# Patient Record
Sex: FEMALE | Race: WHITE | Employment: OTHER | URBAN - METROPOLITAN AREA
[De-identification: names, ages, dates, MRNs, and addresses within clinical notes are randomized per-mention and may not be internally consistent; named-entity substitution may affect disease eponyms.]

---

## 2020-01-27 ENCOUNTER — HOSPITAL ENCOUNTER (EMERGENCY)
Age: 65
Discharge: HOME | End: 2020-01-27
Attending: EMERGENCY MEDICINE
Payer: COMMERCIAL

## 2020-01-27 VITALS
SYSTOLIC BLOOD PRESSURE: 171 MMHG | TEMPERATURE: 97.9 F | WEIGHT: 260 LBS | OXYGEN SATURATION: 96 % | DIASTOLIC BLOOD PRESSURE: 75 MMHG | HEART RATE: 70 BPM | RESPIRATION RATE: 16 BRPM | BODY MASS INDEX: 41.97 KG/M2

## 2020-01-27 DIAGNOSIS — R00.2 PALPITATIONS: Primary | ICD-10-CM

## 2020-01-27 LAB
ANION GAP SERPL CALC-SCNC: 6 MMOL/L (ref 3–18)
ATRIAL RATE: 69 BPM
BASOPHILS # BLD: 0.1 K/UL (ref 0–0.1)
BASOPHILS NFR BLD: 1 % (ref 0–2)
BUN SERPL-MCNC: 14 MG/DL (ref 7–18)
BUN/CREAT SERPL: 22 (ref 12–20)
CALCIUM SERPL-MCNC: 9 MG/DL (ref 8.5–10.1)
CALCULATED P AXIS, ECG09: 52 DEGREES
CALCULATED R AXIS, ECG10: -36 DEGREES
CALCULATED T AXIS, ECG11: 39 DEGREES
CHLORIDE SERPL-SCNC: 100 MMOL/L (ref 100–111)
CO2 SERPL-SCNC: 27 MMOL/L (ref 21–32)
CREAT SERPL-MCNC: 0.63 MG/DL (ref 0.6–1.3)
DIAGNOSIS, 93000: NORMAL
DIFFERENTIAL METHOD BLD: NORMAL
EOSINOPHIL # BLD: 0.3 K/UL (ref 0–0.4)
EOSINOPHIL NFR BLD: 3 % (ref 0–5)
ERYTHROCYTE [DISTWIDTH] IN BLOOD BY AUTOMATED COUNT: 13.8 % (ref 11.6–14.5)
GLUCOSE SERPL-MCNC: 288 MG/DL (ref 74–99)
HCT VFR BLD AUTO: 44.5 % (ref 35–45)
HGB BLD-MCNC: 14.6 G/DL (ref 12–16)
LYMPHOCYTES # BLD: 3.5 K/UL (ref 0.9–3.6)
LYMPHOCYTES NFR BLD: 33 % (ref 21–52)
MAGNESIUM SERPL-MCNC: 1.7 MG/DL (ref 1.6–2.6)
MCH RBC QN AUTO: 29.9 PG (ref 24–34)
MCHC RBC AUTO-ENTMCNC: 32.8 G/DL (ref 31–37)
MCV RBC AUTO: 91.2 FL (ref 74–97)
MONOCYTES # BLD: 0.8 K/UL (ref 0.05–1.2)
MONOCYTES NFR BLD: 8 % (ref 3–10)
NEUTS SEG # BLD: 5.8 K/UL (ref 1.8–8)
NEUTS SEG NFR BLD: 55 % (ref 40–73)
P-R INTERVAL, ECG05: 240 MS
PLATELET # BLD AUTO: 307 K/UL (ref 135–420)
PMV BLD AUTO: 10.3 FL (ref 9.2–11.8)
POTASSIUM SERPL-SCNC: 4.2 MMOL/L (ref 3.5–5.5)
Q-T INTERVAL, ECG07: 436 MS
QRS DURATION, ECG06: 110 MS
QTC CALCULATION (BEZET), ECG08: 467 MS
RBC # BLD AUTO: 4.88 M/UL (ref 4.2–5.3)
SODIUM SERPL-SCNC: 133 MMOL/L (ref 136–145)
TSH SERPL DL<=0.05 MIU/L-ACNC: 5.08 UIU/ML (ref 0.36–3.74)
VENTRICULAR RATE, ECG03: 69 BPM
WBC # BLD AUTO: 10.5 K/UL (ref 4.6–13.2)

## 2020-01-27 PROCEDURE — 99285 EMERGENCY DEPT VISIT HI MDM: CPT

## 2020-01-27 PROCEDURE — 84443 ASSAY THYROID STIM HORMONE: CPT

## 2020-01-27 PROCEDURE — 93005 ELECTROCARDIOGRAM TRACING: CPT

## 2020-01-27 PROCEDURE — 80048 BASIC METABOLIC PNL TOTAL CA: CPT

## 2020-01-27 PROCEDURE — 83735 ASSAY OF MAGNESIUM: CPT

## 2020-01-27 PROCEDURE — 85025 COMPLETE CBC W/AUTO DIFF WBC: CPT

## 2020-01-27 RX ORDER — FLECAINIDE ACETATE 150 MG/1
150 TABLET ORAL 2 TIMES DAILY
COMMUNITY
End: 2020-12-07

## 2020-01-27 RX ORDER — METOPROLOL TARTRATE 25 MG/1
25 TABLET, FILM COATED ORAL 2 TIMES DAILY
COMMUNITY
End: 2020-09-01

## 2020-01-27 RX ORDER — WARFARIN 4 MG/1
TABLET ORAL DAILY
COMMUNITY
End: 2020-09-01

## 2020-01-27 RX ORDER — LISINOPRIL 10 MG/1
10 TABLET ORAL 2 TIMES DAILY
COMMUNITY
End: 2020-08-11 | Stop reason: DRUGHIGH

## 2020-01-27 RX ORDER — ALBUTEROL SULFATE 90 UG/1
2 AEROSOL, METERED RESPIRATORY (INHALATION)
COMMUNITY

## 2020-01-27 RX ORDER — LEVOTHYROXINE SODIUM 100 UG/1
TABLET ORAL
COMMUNITY
End: 2020-09-01 | Stop reason: ALTCHOICE

## 2020-01-27 NOTE — ED TRIAGE NOTES
Pt presents for heart palpitations after yelling at 4year old. Pt has h/o Afib. Takes metoprolol and warfarin.  Denies cp, dizziness

## 2020-01-27 NOTE — DISCHARGE INSTRUCTIONS
Patient Education        Cardiac Arrhythmia: Care Instructions  Your Care Instructions    A cardiac arrhythmia is a change in the normal rhythm of the heart. Your heart may beat too fast or too slow or beat with an irregular or skipping rhythm. A change in the heart's rhythm may feel like a really strong heartbeat or a fluttering in your chest. A severe heart rhythm problem can keep the body from getting the blood it needs. This can result in shortness of breath, lightheadedness, and fainting. You may take medicine to treat your condition. Your doctor may recommend a pacemaker or recommend catheter ablation to destroy small parts of the heart that are causing a rhythm problem. Another possible treatment is an implantable cardioverter-defibrillator (ICD). An ICD is a device that gives the heart a shock to return the heart to a normal rhythm. Follow-up care is a key part of your treatment and safety. Be sure to make and go to all appointments, and call your doctor if you are having problems. It's also a good idea to know your test results and keep a list of the medicines you take. How can you care for yourself at home?  [facility] care    · Be safe with medicines. Take your medicines exactly as prescribed. Call your doctor if you think you are having a problem with your medicine. You will get more details on the specific medicines your doctor prescribes.     · If you received a pacemaker or an ICD, you will get a fact sheet about it.     · Wear medical alert jewelry that says you have an abnormal heart rhythm. You can buy this at most drugstores.    Lifestyle changes    · Eat a heart-healthy diet.     · Stay at a healthy weight. Lose weight if you need to.     · Avoid nicotine, too much alcohol, and illegal drugs (meth, speed, and cocaine). Also, get enough sleep and do not overeat.     · Ask your doctor whether you can take over-the-counter medicines (such as decongestants).  These can make your heart beat fast.     · Talk to your doctor about any limits to activities, such as driving, or tasks where you use power tools or ladders. Activity    · Start light exercise if your doctor says you can. Even a small amount will help you get stronger, have more energy, and manage your stress.     · Get regular exercise. Try for 30 minutes on most days of the week. Ask your doctor what level of exercise is safe for you. If activity is not likely to cause health problems, you probably do not have limits on the type or level of activity that you can do. You may want to walk, swim, bike, or do other activities.     · When you exercise, watch for signs that your heart is working too hard. You are pushing too hard if you cannot talk while you exercise. If you become short of breath or dizzy or have chest pain, sit down and rest.     · Check your pulse daily. Place two fingers on the artery at the palm side of your wrist, in line with your thumb. If your heartbeat seems uneven, talk to your doctor. When should you call for help? Call 911 anytime you think you may need emergency care. For example, call if:    · You have symptoms of sudden heart failure. These may include:  ? Severe trouble breathing. ? A fast or irregular heartbeat. ? Coughing up pink, foamy mucus. ? You passed out.     · You have signs of a stroke. These include:  ? Sudden numbness, paralysis, or weakness in your face, arm, or leg, especially on only one side of your body. ? New problems with walking or balance. ? Sudden vision changes. ? Drooling or slurred speech. ? New problems speaking or understanding simple statements, or feeling confused. ? A sudden, severe headache that is different from past headaches.    Call your doctor now or seek immediate medical care if:    · You have new or changed symptoms of heart failure, such as:  ? New or increased shortness of breath. ? New or worse swelling in your legs, ankles, or feet.   ? Sudden weight gain, such as more than 2 to 3 pounds in a day or 5 pounds in a week. (Your doctor may suggest a different range of weight gain.)  ? Feeling dizzy or lightheaded or like you may faint. ? Feeling so tired or weak that you cannot do your usual activities. ? Not sleeping well. Shortness of breath wakes you at night. You need extra pillows to prop yourself up to breathe easier.    Watch closely for changes in your health, and be sure to contact your doctor if:    · You do not get better as expected. Where can you learn more? Go to http://shirin-rebeka.info/. Enter C497 in the search box to learn more about \"Cardiac Arrhythmia: Care Instructions. \"  Current as of: April 9, 2019  Content Version: 12.2  © 9480-9965 [employer]. Care instructions adapted under license by [employer] (which disclaims liability or warranty for this information). If you have questions about a medical condition or this instruction, always ask your healthcare professional. [address] any warranty or liability for your use of this information.

## 2020-01-27 NOTE — ED PROVIDER NOTES
EMERGENCY DEPARTMENT HISTORY AND PHYSICAL EXAM    11:46 AM      Date: 1/27/2020  Patient Name: Sudeep Louis    History of Presenting Illness     Chief Complaint   Patient presents with    Palpitations         History Provided By: patient    Additional History (Context): Sudeep Louis is a 62 y.o. female presents with history of diabetes hypertension SVT and A. fib, comes to the emergency department with increased palpitations over the last 3 days, the worst was yesterday persistent for 2 hours with heart pounding and racing. She has had other much shorter lived episodes of this. She has known SVT and A. fib, sees a cardiologist in [place] but recently moved to this area in October does not have cardiology service down here. No coronary artery disease. No chest pain shortness of breath nausea vomiting or sweats with this. No other recent illness. Symptoms are not present at this time. PCP: Cindi Panda MD    Chief Complaint:   Duration:    Timing:    Location:   Quality:   Severity:   Modifying Factors:   Associated Symptoms:       Current Outpatient Medications   Medication Sig Dispense Refill    insulin aspart protamine/insulin aspart (NOVOLOG MIX 70-30 FLEXPEN) 100 unit/mL (70-30) flex pen by SubCUTAneous route.  ibuprofen (ADVIL) 200 mg tablet Take  by mouth.  albuterol (PROVENTIL HFA, VENTOLIN HFA, PROAIR HFA) 90 mcg/actuation inhaler Take  by inhalation.  multivitamin (ONE A DAY) tablet Take 1 Tab by mouth daily.  HYDROcodone-acetaminophen (VICODIN) 5-500 mg per tablet Take 1 Tab by mouth every six (6) hours as needed for Pain. 12 Tab 0    METFORMIN HCL (METFORMIN PO) Take  by mouth.  duloxetine (CYMBALTA) 30 mg capsule Take 30 mg by mouth daily.  nebivolol (BYSTOLIC) 5 mg tablet Take 5 mg by mouth daily.          Past History     Past Medical History:  Past Medical History:   Diagnosis Date    Asthma     Diabetes ([address]     Hypertension        Past Surgical History:  Past Surgical History:   Procedure Laterality Date    HX GYN         Family History:  History reviewed. No pertinent family history. Social History:  Social History     Tobacco Use    Smoking status: Never Smoker   Substance Use Topics    Alcohol use: No    Drug use: Not on file       Allergies:  No Known Allergies      Review of Systems     Review of Systems   Constitutional: Negative for diaphoresis and fever. HENT: Negative for congestion and sore throat. Eyes: Negative for pain and itching. Respiratory: Negative for cough and shortness of breath. Cardiovascular: Positive for palpitations. Negative for chest pain. Gastrointestinal: Negative for abdominal pain and diarrhea. Endocrine: Negative for polydipsia and polyuria. Genitourinary: Negative for dysuria and hematuria. Musculoskeletal: Negative for arthralgias and myalgias. Skin: Negative for rash and wound. Neurological: Negative for seizures and syncope. Hematological: Does not bruise/bleed easily. Psychiatric/Behavioral: Negative for agitation and hallucinations. Physical Exam       Patient Vitals for the past 12 hrs:   Temp Pulse Resp BP SpO2   01/27/20 1126    179/75    01/27/20 1120 97.9 °F (36.6 °C) 96 13 (!) 196/107 97 %       Physical Exam  Vitals signs and nursing note reviewed. Constitutional:       General: She is not in acute distress. Appearance: She is well-developed. She is obese. She is not ill-appearing. HENT:      Head: Normocephalic and atraumatic. Eyes:      General: No scleral icterus. Conjunctiva/sclera: Conjunctivae normal.   Neck:      Musculoskeletal: Normal range of motion and neck supple. Vascular: No JVD. Cardiovascular:      Rate and Rhythm: Normal rate and regular rhythm. Heart sounds: Normal heart sounds. Comments: 4 intact extremity pulses  Pulmonary:      Effort: Pulmonary effort is normal.      Breath sounds: Normal breath sounds. Abdominal:      Palpations: Abdomen is soft. There is no mass. Tenderness: There is no tenderness. Musculoskeletal: Normal range of motion. Lymphadenopathy:      Cervical: No cervical adenopathy. Skin:     General: Skin is warm and dry. Neurological:      Mental Status: She is alert. Diagnostic Study Results   Labs -  Recent Results (from the past 12 hour(s))   EKG, 12 LEAD, INITIAL    Collection Time: 01/27/20 11:20 AM   Result Value Ref Range    Ventricular Rate 69 BPM    Atrial Rate 69 BPM    P-R Interval 240 ms    QRS Duration 110 ms    Q-T Interval 436 ms    QTC Calculation (Bezet) 467 ms    Calculated P Axis 52 degrees    Calculated R Axis -36 degrees    Calculated T Axis 39 degrees    Diagnosis       Sinus rhythm with 1st degree AV block  Left axis deviation  Minimal voltage criteria for LVH, may be normal variant ( Constantine product )  Abnormal ECG  No previous ECGs available     CBC WITH AUTOMATED DIFF    Collection Time: 01/27/20 11:35 AM   Result Value Ref Range    WBC 10.5 4.6 - 13.2 K/uL    RBC 4.88 4.20 - 5.30 M/uL    HGB 14.6 12.0 - 16.0 g/dL    HCT 44.5 35.0 - 45.0 %    MCV 91.2 74.0 - 97.0 FL    MCH 29.9 24.0 - 34.0 PG    MCHC 32.8 31.0 - 37.0 g/dL    RDW 13.8 11.6 - 14.5 %    PLATELET 250 990 - 231 K/uL    MPV 10.3 9.2 - 11.8 FL    NEUTROPHILS 55 40 - 73 %    LYMPHOCYTES 33 21 - 52 %    MONOCYTES 8 3 - 10 %    EOSINOPHILS 3 0 - 5 %    BASOPHILS 1 0 - 2 %    ABS. NEUTROPHILS 5.8 1.8 - 8.0 K/UL    ABS. LYMPHOCYTES 3.5 0.9 - 3.6 K/UL    ABS. MONOCYTES 0.8 0.05 - 1.2 K/UL    ABS. EOSINOPHILS 0.3 0.0 - 0.4 K/UL    ABS.  BASOPHILS 0.1 0.0 - 0.1 K/UL    DF AUTOMATED     METABOLIC PANEL, BASIC    Collection Time: 01/27/20 11:35 AM   Result Value Ref Range    Sodium 133 (L) 136 - 145 mmol/L    Potassium 4.2 3.5 - 5.5 mmol/L    Chloride 100 100 - 111 mmol/L    CO2 27 21 - 32 mmol/L    Anion gap 6 3.0 - 18 mmol/L    Glucose 288 (H) 74 - 99 mg/dL    BUN 14 7.0 - 18 MG/DL    Creatinine 0. 63 0.6 - 1.3 MG/DL    BUN/Creatinine ratio 22 (H) 12 - 20      GFR est AA >60 >60 ml/min/1.73m2    GFR est non-AA >60 >60 ml/min/1.73m2    Calcium 9.0 8.5 - 10.1 MG/DL   MAGNESIUM    Collection Time: 01/27/20 11:35 AM   Result Value Ref Range    Magnesium 1.7 1.6 - 2.6 mg/dL   TSH 3RD GENERATION    Collection Time: 01/27/20 11:35 AM   Result Value Ref Range    TSH 5.08 (H) 0.36 - 3.74 uIU/mL       Radiologic Studies -   No orders to display     No results found. Medications ordered:   Medications - No data to display      Medical Decision Making   Initial Medical Decision Making and DDx:  Will evaluate for contributory causes, thyroid derangement metabolic derangement potassium abnormality magnesium. Not consistent with ACS. No arrhythmia present at this time. She plans to keep her cardiologist in [place] because she has a good relationship with him but says this may be dependent on transportation. We will give her a local number of our electrophysiologist.  She is happy with this plan. As long as no actionable findings come out we will be able to discharge her. ED Course: Progress Notes, Reevaluation, and Consults:    12:26 PM Pt reevaluated at this time. Discussed results and findings, as well as, diagnosis and plan for discharge. Follow up with doctors/services listed. Return to the emergency department for alarming symptoms. Pt verbalizes understanding and agreement with plan. All questions addressed. Borderline high TSH, not sure this is even contributing, will defer to cardiology. Patient has had no further symptoms while she is here and no alarming will discharge with follow-up to cardiology. Discussed options depending on how much distress she experiences with this from simply tolerating the symptoms all the way up to a Maze procedure. She is okay with plan for discharge      I am the first provider for this patient.     I reviewed the vital signs, available nursing notes, past medical history, past surgical history, family history and social history. Patient Vitals for the past 12 hrs:   Temp Pulse Resp BP SpO2   01/27/20 1126    179/75    01/27/20 1120 97.9 °F (36.6 °C) 96 13 (!) 196/107 97 %       Vital Signs-Reviewed the patient's vital signs. Pulse Oximetry Analysis, Cardiac Monitor, 12 lead ekg:  Twelve-lead EKG at 1120 sinus rhythm at 69 no acute process. Telemetry sinus rhythm at 96. Oximetry 97% on room air  Interpreted by the EP. Records Reviewed: Nursing notes reviewed (Time of Review: 11:46 AM)    Procedures:   Critical Care Time:   Aspirin: (was aspirin given for stroke?)    Diagnosis     Clinical Impression:   1. Palpitations        Disposition: Discharged      Follow-up Information     Follow up With Specialties Details Why Contact Info    Ross Gonzalez MD Cardiology In 2 days  [facility]  [phone]             Patient's Medications   Start Taking    No medications on file   Continue Taking    ALBUTEROL (PROVENTIL HFA, VENTOLIN HFA, PROAIR HFA) 90 MCG/ACTUATION INHALER    Take  by inhalation. DULOXETINE (CYMBALTA) 30 MG CAPSULE    Take 30 mg by mouth daily. HYDROCODONE-ACETAMINOPHEN (VICODIN) 5-500 MG PER TABLET    Take 1 Tab by mouth every six (6) hours as needed for Pain. IBUPROFEN (ADVIL) 200 MG TABLET    Take  by mouth. INSULIN ASPART PROTAMINE/INSULIN ASPART (NOVOLOG MIX 70-30 FLEXPEN) 100 UNIT/ML (70-30) FLEX PEN    by SubCUTAneous route. METFORMIN HCL (METFORMIN PO)    Take  by mouth. MULTIVITAMIN (ONE A DAY) TABLET    Take 1 Tab by mouth daily. NEBIVOLOL (BYSTOLIC) 5 MG TABLET    Take 5 mg by mouth daily.    These Medications have changed    No medications on file   Stop Taking    No medications on file     _______________________________    Notes:    Alveta Blizzard, MD using Dragon dictation      _______________________________

## 2020-07-27 ENCOUNTER — TELEPHONE (OUTPATIENT)
Dept: PRIMARY CARE CLINIC | Age: 65
End: 2020-07-27

## 2020-07-27 NOTE — TELEPHONE ENCOUNTER
Patient called rocio , she has a sleeping disorder and wants to be put back on that, a sleep Dr prescriped it and she no longer knows who that was.

## 2020-08-03 DIAGNOSIS — E11.9 TYPE 2 DIABETES MELLITUS WITHOUT COMPLICATIONS (HCC): ICD-10-CM

## 2020-08-03 RX ORDER — LISINOPRIL 20 MG/1
TABLET ORAL
Qty: 60 TAB | Refills: 0 | Status: SHIPPED | OUTPATIENT
Start: 2020-08-03 | End: 2020-08-11 | Stop reason: DRUGHIGH

## 2020-08-03 NOTE — TELEPHONE ENCOUNTER
Please call the patient and let her know that the provigil is a scheduled drug so she will need to discuss this with eliza at her appointment.

## 2020-08-06 DIAGNOSIS — E11.9 TYPE 2 DIABETES MELLITUS WITHOUT COMPLICATIONS (HCC): ICD-10-CM

## 2020-08-10 ENCOUNTER — TELEPHONE (OUTPATIENT)
Dept: PRIMARY CARE CLINIC | Age: 65
End: 2020-08-10

## 2020-08-10 DIAGNOSIS — I10 ESSENTIAL (PRIMARY) HYPERTENSION: Primary | ICD-10-CM

## 2020-08-10 DIAGNOSIS — E11.9 TYPE 2 DIABETES MELLITUS WITHOUT COMPLICATIONS (HCC): ICD-10-CM

## 2020-08-10 DIAGNOSIS — J30.2 OTHER SEASONAL ALLERGIC RHINITIS: ICD-10-CM

## 2020-08-11 RX ORDER — LORATADINE 10 MG/1
10 TABLET ORAL DAILY
Qty: 30 TAB | Refills: 0 | Status: SHIPPED | OUTPATIENT
Start: 2020-08-11 | End: 2020-11-28

## 2020-08-11 RX ORDER — LORATADINE 10 MG/1
10 TABLET ORAL DAILY
COMMUNITY
Start: 2020-07-19 | End: 2020-08-11 | Stop reason: SDUPTHER

## 2020-08-11 RX ORDER — LISINOPRIL 20 MG/1
20 TABLET ORAL 2 TIMES DAILY
Qty: 60 TAB | Refills: 0 | Status: SHIPPED | OUTPATIENT
Start: 2020-08-11 | End: 2020-09-01 | Stop reason: SDUPTHER

## 2020-08-11 NOTE — TELEPHONE ENCOUNTER
I sent the loratidine and lisinopril.  I didn't send the:  Gabapentin is 100mg 3 times a day  Metformin 500 ER take 2 in morning and 3 in the evening   She has an appointment with you coming up so all her medications can get back on track

## 2020-08-11 NOTE — TELEPHONE ENCOUNTER
Gabapentin   loratadine 10  [id number] 2 in monring and two in evening with meal  linsinopril 20 twice a day.   [id number]  It appears she is not getting enough refills to last her until her next appointment

## 2020-08-12 NOTE — TELEPHONE ENCOUNTER
Noted.   I see she has enough metformin to get to appt on 9/1 and the gabapentin is a prn medication which  Is odd.

## 2020-08-14 ENCOUNTER — TELEPHONE (OUTPATIENT)
Dept: PRIMARY CARE CLINIC | Age: 65
End: 2020-08-14

## 2020-08-14 DIAGNOSIS — E11.9 TYPE 2 DIABETES MELLITUS WITHOUT COMPLICATION, UNSPECIFIED WHETHER LONG TERM INSULIN USE (HCC): Primary | ICD-10-CM

## 2020-08-14 RX ORDER — EMPAGLIFLOZIN 25 MG/1
25 TABLET, FILM COATED ORAL DAILY
Qty: 90 TAB | Refills: 0 | Status: SHIPPED | OUTPATIENT
Start: 2020-08-14 | End: 2020-09-01

## 2020-08-14 RX ORDER — EMPAGLIFLOZIN 25 MG/1
25 TABLET, FILM COATED ORAL DAILY
COMMUNITY
Start: 2020-06-16 | End: 2020-08-14 | Stop reason: SDUPTHER

## 2020-08-14 NOTE — TELEPHONE ENCOUNTER
I don't know why she is not requesting for her meds all at once; she was just seen one month ago. jardiance sent   Gabapentin 100 tid

## 2020-08-14 NOTE — TELEPHONE ENCOUNTER
Jc Mallory and said Dr. [employer] only filled her Loratadine.  She needs her Jardiance and Gabapentin refilled

## 2020-08-17 DIAGNOSIS — E11.9 TYPE 2 DIABETES MELLITUS WITHOUT COMPLICATION, UNSPECIFIED WHETHER LONG TERM INSULIN USE (HCC): ICD-10-CM

## 2020-08-17 RX ORDER — GABAPENTIN 100 MG/1
CAPSULE ORAL
Qty: 15 CAP | Refills: 0 | Status: SHIPPED | OUTPATIENT
Start: 2020-08-17 | End: 2020-09-21 | Stop reason: SDUPTHER

## 2020-08-17 RX ORDER — GABAPENTIN 100 MG/1
CAPSULE ORAL
COMMUNITY
Start: 2020-06-05 | End: 2020-08-17 | Stop reason: SDUPTHER

## 2020-08-18 NOTE — TELEPHONE ENCOUNTER
I am not clear why this medication is being written, it is in PRN formal and there is no diagnosis on problem list to support. I have sent #15 til her PCP can address this. Please route to [facility] after pt contacted.

## 2020-08-22 RX ORDER — EMPAGLIFLOZIN 25 MG/1
TABLET, FILM COATED ORAL
Qty: 90 TAB | Refills: 0 | OUTPATIENT
Start: 2020-08-22

## 2020-08-22 RX ORDER — METFORMIN HYDROCHLORIDE 500 MG/1
TABLET, EXTENDED RELEASE ORAL
Qty: 360 TAB | Refills: 0 | Status: SHIPPED | OUTPATIENT
Start: 2020-08-22 | End: 2020-09-01 | Stop reason: DRUGHIGH

## 2020-08-22 RX ORDER — GABAPENTIN 100 MG/1
CAPSULE ORAL
Qty: 90 CAP | Refills: 0 | OUTPATIENT
Start: 2020-08-22

## 2020-08-22 RX ORDER — SITAGLIPTIN 100 MG/1
TABLET, FILM COATED ORAL
Qty: 90 TAB | Refills: 0 | Status: SHIPPED | OUTPATIENT
Start: 2020-08-22 | End: 2020-09-01 | Stop reason: DRUGHIGH

## 2020-09-01 ENCOUNTER — OFFICE VISIT (OUTPATIENT)
Dept: PRIMARY CARE CLINIC | Age: 65
End: 2020-09-01
Payer: COMMERCIAL

## 2020-09-01 VITALS
HEART RATE: 72 BPM | OXYGEN SATURATION: 98 % | DIASTOLIC BLOOD PRESSURE: 82 MMHG | WEIGHT: 237 LBS | HEIGHT: 66 IN | RESPIRATION RATE: 18 BRPM | TEMPERATURE: 97.4 F | SYSTOLIC BLOOD PRESSURE: 130 MMHG | BODY MASS INDEX: 38.09 KG/M2

## 2020-09-01 DIAGNOSIS — B35.1 ONYCHOMYCOSIS OF TOENAIL: Chronic | ICD-10-CM

## 2020-09-01 DIAGNOSIS — J30.2 OTHER SEASONAL ALLERGIC RHINITIS: ICD-10-CM

## 2020-09-01 DIAGNOSIS — R20.2 PARESTHESIA: ICD-10-CM

## 2020-09-01 DIAGNOSIS — I10 ESSENTIAL (PRIMARY) HYPERTENSION: Chronic | ICD-10-CM

## 2020-09-01 DIAGNOSIS — E11.9 TYPE 2 DIABETES MELLITUS WITHOUT COMPLICATION, UNSPECIFIED WHETHER LONG TERM INSULIN USE (HCC): ICD-10-CM

## 2020-09-01 DIAGNOSIS — L21.9 SEBORRHEIC ECZEMA: ICD-10-CM

## 2020-09-01 DIAGNOSIS — E66.01 SEVERE OBESITY (HCC): ICD-10-CM

## 2020-09-01 PROCEDURE — 99214 OFFICE O/P EST MOD 30 MIN: CPT | Performed by: FAMILY MEDICINE

## 2020-09-01 RX ORDER — BETAMETHASONE DIPROPIONATE 0.5 MG/G
CREAM TOPICAL
Qty: 45 G | Refills: 1 | Status: SHIPPED | OUTPATIENT
Start: 2020-09-01

## 2020-09-01 RX ORDER — LISINOPRIL 20 MG/1
20 TABLET ORAL 2 TIMES DAILY
Qty: 180 TAB | Refills: 1 | Status: SHIPPED | OUTPATIENT
Start: 2020-09-01 | End: 2020-12-07 | Stop reason: SDUPTHER

## 2020-09-01 RX ORDER — SITAGLIPTIN AND METFORMIN HYDROCHLORIDE 50; 1000 MG/1; MG/1
1 TABLET, FILM COATED, EXTENDED RELEASE ORAL 2 TIMES DAILY
Qty: 180 TAB | Refills: 0 | Status: SHIPPED | OUTPATIENT
Start: 2020-09-01 | End: 2020-11-28

## 2020-09-01 RX ORDER — EMPAGLIFLOZIN 25 MG/1
TABLET, FILM COATED ORAL
Qty: 90 TAB | Refills: 1 | Status: SHIPPED | OUTPATIENT
Start: 2020-09-01 | End: 2020-12-07 | Stop reason: SDUPTHER

## 2020-09-01 RX ORDER — LEVOTHYROXINE SODIUM 25 UG/1
25 TABLET ORAL ONCE
COMMUNITY
Start: 2020-05-29 | End: 2020-09-01 | Stop reason: SDUPTHER

## 2020-09-01 RX ORDER — LEVOTHYROXINE SODIUM 25 UG/1
25 TABLET ORAL ONCE
Qty: 90 TAB | Refills: 1 | Status: SHIPPED | OUTPATIENT
Start: 2020-09-01 | End: 2020-09-01

## 2020-09-01 RX ORDER — CHLORPHENIRAMINE MALEATE 4 MG
TABLET ORAL 2 TIMES DAILY
Qty: 60 G | Refills: 2 | Status: SHIPPED | OUTPATIENT
Start: 2020-09-01

## 2020-09-01 RX ORDER — CYCLOBENZAPRINE HCL 10 MG
10 TABLET ORAL 3 TIMES DAILY
COMMUNITY
Start: 2020-06-11

## 2020-09-01 RX ORDER — WARFARIN 2 MG/1
6 TABLET ORAL DAILY
COMMUNITY
Start: 2020-08-14

## 2020-09-01 RX ORDER — SOTALOL HYDROCHLORIDE 80 MG/1
80 TABLET ORAL 2 TIMES DAILY
COMMUNITY
Start: 2020-08-03

## 2020-09-01 NOTE — PROGRESS NOTES
HISTORY OF PRESENT ILLNESS  Diabetes:  DM follow up appt  FBS- not checking     Last HbA1c was 9.1 in 6/2020 down from 10.1/  Says chips is her vise. PT is taking  jardiance 25, metfomin 1000 BID, janivia 100. reports he used to be on  Insulin 70/30 yrs ago bc    [employer] she had to come off it. Since regained insurance Freddie Ortega prefers to try pills. There is no medication concerns. Has neurological symptoms  And low dose of gabapentin 100 BID is helping. The higher dose made her stupid yrs ago. and cuts heal well. Checks feet at home. cannot cut nails. Her podiatrist no longer takes her insurance. Has chronic fungal infection. Red rash on forehead and cheeks X weeks. No contact chemicals. Hypertension:  PT taking lisinopril 20 BID , sotalol. Bp has been controlled    Hypothyroid :    stable on levothyroxine 25mcg. No weight gain or dry skin, constipation. Allergic rhinitis _ OK on Claritin. Asthma controlled. Cardio  Dr Deisi Hebert every 6 months. Has atrial fibrillation  On sotalol  And warfarin. They are considering ablation. Catie Mendoza is a 62 y.o. female. Past Medical History:   Diagnosis Date    Allergies     Asthma     Cardiac arrhythmia     Diabetes ([address]     Hx of long term use of blood thinners     Hypertension     Thyroid disease      Social History     Tobacco Use    Smoking status: Never Smoker    Smokeless tobacco: Never Used   Substance Use Topics    Alcohol use: No    Drug use: Never       Family History   Problem Relation Age of Onset    Hypertension Other     Diabetes Other     Heart Disease Other        Review of Systems   Constitutional: Negative. Negative for weight loss. HENT: Negative for congestion and sore throat. Eyes: Negative for blurred vision. Respiratory: Negative for cough. Cardiovascular: Negative for chest pain, palpitations, orthopnea and leg swelling.    Gastrointestinal: Negative for abdominal pain, heartburn and nausea. Musculoskeletal: Negative for falls and myalgias. Skin: Positive for rash (forehead). Neurological: Negative for dizziness, tremors, weakness and headaches. Endo/Heme/Allergies: Positive for environmental allergies. Psychiatric/Behavioral: Negative for depression. The patient has insomnia (unisom helps). Physical Exam  Constitutional:       General: She is not in acute distress. Appearance: Normal appearance. She is obese. She is not ill-appearing. HENT:      Head: Normocephalic and atraumatic. Mouth/Throat:      Mouth: Mucous membranes are moist.   Eyes:      General: No scleral icterus. Extraocular Movements: Extraocular movements intact. Conjunctiva/sclera: Conjunctivae normal.   Cardiovascular:      Rate and Rhythm: Normal rate and regular rhythm. Pulses: Normal pulses. Dorsalis pedis pulses are 2+ on the right side and 2+ on the left side. Heart sounds: Normal heart sounds. Pulmonary:      Effort: Pulmonary effort is normal. No respiratory distress. Breath sounds: Normal breath sounds. No wheezing or rales. Musculoskeletal:      Right lower leg: No edema. Left lower leg: No edema. Feet:      Right foot:      Skin integrity: Dry skin present. No skin breakdown or warmth. Toenail Condition: Right toenails are abnormally thick. Fungal disease present. Left foot:      Skin integrity: Dry skin present. No skin breakdown or warmth. Lymphadenopathy:      Cervical: No cervical adenopathy. Skin:     General: Skin is warm. Capillary Refill: Capillary refill takes less than 2 seconds. Findings: Erythema and rash present. Rash is papular and scaling. Comments: On forehead , lateral cheeks. Eyebrows scaly   Neurological:      General: No focal deficit present. Mental Status: She is alert and oriented to person, place, and time. Cranial Nerves: No cranial nerve deficit. Psychiatric:         Mood and Affect: Mood normal.         Behavior: Behavior normal.         Thought Content: Thought content normal.           ASSESSMENT and PLAN  Diagnoses and all orders for this visit:    1. Diabetes mellitus with neurological manifestations, uncontrolled ([address]  Comments:  Poor control. Please start checking. mix metformin and januvia.   will see what a1c is to decide If I should restart insulin 70/30  Orders:  -     LIPID PANEL  -     HEMOGLOBIN A1C WITH EAG    2. Essential (primary) hypertension  Comments: At goal  Orders:  -     METABOLIC PANEL, COMPREHENSIVE  -     CBC WITH AUTOMATED DIFF  -     LIPID PANEL  -     lisinopriL (PRINIVIL, ZESTRIL) 20 mg tablet; Take 1 Tab by mouth two (2) times a day. Indications: high blood pressure    3. Onychomycosis of toenail  Comments:  all nails with gryphosis. advised vingera and water soaks, aftre soaks best time to trim nails. Judy Ask add lotrinim  BID and vicks vaporub at bedtime. 4. Paresthesia  Comments:  check b12.  ok to continue gabapentin. Orders:  -     VITAMIN B12    5. Severe obesity (HCC)    6. Other seasonal allergic rhinitis    7. Seborrheic eczema  Comments:  on face with scaling on scalp milder. add headashoulder  1 min  sev times per week. moisturise. use prn steroid topically. do not use > 10 days continously    Other orders  -     SITagliptin-metFORMIN (Janumet XR) 50-1,000 mg TM24; Take 1 Tab by mouth two (2) times a day. Indications: type 2 diabetes mellitus  -     levothyroxine (SYNTHROID) 25 mcg tablet; Take 1 Tab by mouth once for 1 dose. Indications: a condition with low thyroid hormone levels  -     clotrimazole (LOTRIMIN) 1 % topical cream; Apply  to affected area two (2) times a day. On FEET  -     betamethasone dipropionate (DIPROSONE) 0.05 % topical cream; Apply  to affected area two (2) times daily as needed for Skin Irritation.  Of face         Orders Placed This Encounter    METABOLIC PANEL, COMPREHENSIVE  CBC WITH AUTOMATED DIFF    LIPID PANEL    HEMOGLOBIN A1C WITH EAG    VITAMIN B12    DISCONTD: levothyroxine (SYNTHROID) 25 mcg tablet    warfarin (COUMADIN) 2 mg tablet    sotaloL (BETAPACE) 80 mg tablet    cyclobenzaprine (FLEXERIL) 10 mg tablet    diphenhydramine HCl (NIGHTIME SLEEP PO)    SITagliptin-metFORMIN (Janumet XR) 50-1,000 mg TM24    lisinopriL (PRINIVIL, ZESTRIL) 20 mg tablet    levothyroxine (SYNTHROID) 25 mcg tablet    clotrimazole (LOTRIMIN) 1 % topical cream    betamethasone dipropionate (DIPROSONE) 0.05 % topical cream

## 2020-09-02 LAB
ALBUMIN SERPL-MCNC: 4.4 G/DL (ref 3.8–4.8)
ALBUMIN/GLOB SERPL: 1.7 {RATIO} (ref 1.2–2.2)
ALP SERPL-CCNC: 88 IU/L (ref 39–117)
ALT SERPL-CCNC: 14 IU/L (ref 0–32)
AST SERPL-CCNC: 20 IU/L (ref 0–40)
BASOPHILS # BLD AUTO: 0.1 X10E3/UL (ref 0–0.2)
BASOPHILS NFR BLD AUTO: 1 %
BILIRUB SERPL-MCNC: 0.5 MG/DL (ref 0–1.2)
BUN SERPL-MCNC: 9 MG/DL (ref 8–27)
BUN/CREAT SERPL: 16 (ref 12–28)
CALCIUM SERPL-MCNC: 9.3 MG/DL (ref 8.7–10.3)
CHLORIDE SERPL-SCNC: 98 MMOL/L (ref 96–106)
CHOLEST SERPL-MCNC: 180 MG/DL (ref 100–199)
CO2 SERPL-SCNC: 21 MMOL/L (ref 20–29)
COMMENT:: ABNORMAL
CREAT SERPL-MCNC: 0.57 MG/DL (ref 0.57–1)
EOSINOPHIL # BLD AUTO: 0.2 X10E3/UL (ref 0–0.4)
EOSINOPHIL NFR BLD AUTO: 2 %
ERYTHROCYTE [DISTWIDTH] IN BLOOD BY AUTOMATED COUNT: 14.2 % (ref 11.7–15.4)
EST. AVERAGE GLUCOSE BLD GHB EST-MCNC: 163 MG/DL
GLOBULIN SER CALC-MCNC: 2.6 G/DL (ref 1.5–4.5)
GLUCOSE SERPL-MCNC: 146 MG/DL (ref 65–99)
HBA1C MFR BLD: 7.3 % (ref 4.8–5.6)
HCT VFR BLD AUTO: 47.4 % (ref 34–46.6)
HDLC SERPL-MCNC: 44 MG/DL
HGB BLD-MCNC: 15.5 G/DL (ref 11.1–15.9)
IMM GRANULOCYTES # BLD AUTO: 0.1 X10E3/UL (ref 0–0.1)
IMM GRANULOCYTES NFR BLD AUTO: 1 %
LDLC SERPL CALC-MCNC: 98 MG/DL (ref 0–99)
LYMPHOCYTES # BLD AUTO: 5.1 X10E3/UL (ref 0.7–3.1)
LYMPHOCYTES NFR BLD AUTO: 35 %
MCH RBC QN AUTO: 28.5 PG (ref 26.6–33)
MCHC RBC AUTO-ENTMCNC: 32.7 G/DL (ref 31.5–35.7)
MCV RBC AUTO: 87 FL (ref 79–97)
MONOCYTES # BLD AUTO: 1 X10E3/UL (ref 0.1–0.9)
MONOCYTES NFR BLD AUTO: 7 %
NEUTROPHILS # BLD AUTO: 7.9 X10E3/UL (ref 1.4–7)
NEUTROPHILS NFR BLD AUTO: 54 %
PLATELET # BLD AUTO: 397 X10E3/UL (ref 150–450)
POTASSIUM SERPL-SCNC: 4.7 MMOL/L (ref 3.5–5.2)
PROT SERPL-MCNC: 7 G/DL (ref 6–8.5)
RBC # BLD AUTO: 5.44 X10E6/UL (ref 3.77–5.28)
SODIUM SERPL-SCNC: 137 MMOL/L (ref 134–144)
TRIGL SERPL-MCNC: 223 MG/DL (ref 0–149)
VIT B12 SERPL-MCNC: 454 PG/ML (ref 232–1245)
VLDLC SERPL CALC-MCNC: 38 MG/DL (ref 5–40)
WBC # BLD AUTO: 14.4 X10E3/UL (ref 3.4–10.8)

## 2020-09-09 ENCOUNTER — TELEPHONE (OUTPATIENT)
Dept: PRIMARY CARE CLINIC | Age: 65
End: 2020-09-09

## 2020-09-09 DIAGNOSIS — D72.820 LYMPHOCYTOSIS: Primary | Chronic | ICD-10-CM

## 2020-09-09 RX ORDER — AMOXICILLIN AND CLAVULANATE POTASSIUM 875; 125 MG/1; MG/1
1 TABLET, FILM COATED ORAL EVERY 12 HOURS
Qty: 20 TAB | Refills: 0 | Status: SHIPPED | OUTPATIENT
Start: 2020-09-09 | End: 2020-09-19

## 2020-09-09 NOTE — PROGRESS NOTES
Normal  blood count, normal liver and kidney function and normal blood sugar. Normal Vitamin  B12   White cell was elevated at time of lab test.  Has she been feeling ill?   LDL 98  Goal under 70.    a1c 7.3 goal under 7

## 2020-09-16 ENCOUNTER — TELEPHONE (OUTPATIENT)
Dept: PRIMARY CARE CLINIC | Age: 65
End: 2020-09-16

## 2020-09-18 NOTE — TELEPHONE ENCOUNTER
Pt said she is not very happy with the way her situation is being handled she said she want this taken care of now

## 2020-09-21 RX ORDER — GABAPENTIN 100 MG/1
CAPSULE ORAL
Qty: 90 CAP | Refills: 2 | Status: SHIPPED | OUTPATIENT
Start: 2020-09-21 | End: 2020-12-07 | Stop reason: SDUPTHER

## 2020-09-21 NOTE — TELEPHONE ENCOUNTER
My apologies for the oversight. I guess I overlooked asking her if she needed refill. refills sent, enough to last 3 months.   Follow up is in Nov.

## 2020-10-02 ENCOUNTER — TELEPHONE (OUTPATIENT)
Dept: PRIMARY CARE CLINIC | Age: 65
End: 2020-10-02

## 2020-10-02 DIAGNOSIS — F41.8 DEPRESSION WITH ANXIETY: Primary | Chronic | ICD-10-CM

## 2020-10-02 RX ORDER — BUSPIRONE HYDROCHLORIDE 5 MG/1
5 TABLET ORAL 3 TIMES DAILY
Qty: 90 TAB | Refills: 0 | Status: SHIPPED | OUTPATIENT
Start: 2020-10-02 | End: 2020-11-06

## 2020-10-02 RX ORDER — DULOXETIN HYDROCHLORIDE 20 MG/1
20 CAPSULE, DELAYED RELEASE ORAL 2 TIMES DAILY
Qty: 60 CAP | Refills: 0 | Status: SHIPPED | OUTPATIENT
Start: 2020-10-02 | End: 2020-11-06

## 2020-10-02 NOTE — TELEPHONE ENCOUNTER
Pt called and said her last appt she was asked the 15 or so questions about depression but then nothing was very talked about after she answered the questions, and she knows she is depressed and has called a couple of times to talk to Dr. Jacob Mohr but she got no returned calls and at this point she is ready to jump off of a F bridge because she dont F care about anything anymore.

## 2020-10-02 NOTE — TELEPHONE ENCOUNTER
The staff kept  Her on hold to get the . Pt hung up. Mrs Conrad Dash called me  And immedialetly called the pt ( I am not in office. Pt was seen on 2020. While speaking to pt, even though not documented in the note of that day I did recall we spoke about it that day. She lives with  A service family whom are moving to [place] later this year. She does not want to go. She  Has no where to go, she is distressed about it. Pt did not want to be on meds at the time. She is not seeing a counsler. Encouraged seeing a counselor. bc she says there is no transport for her to go. She denied suicidal ideation or desiring to do it, because she would be a vegetable if she tried. I asked again if she feel she would try  She said no. I asked her about medication again today- she said nothing is worked in the past except for Cymbalta after her son . I asked her if she like to try it again she said yes. I also advised her that there are some counselors that we will do visits by video or food or even trouble, I gave her the number for Kaia Proctor LCSW , surprisingly she already knows him. A friend of hers used  to go to him for counseling. I asked her if she trusted him, she said yes, and that she would call him.

## 2020-10-02 NOTE — TELEPHONE ENCOUNTER
Plan:  Has follow-up appointment in 4 weeks, will review Cymbalta then. Advised to start with 1 tablet daily for a week then increase to twice a day. Add BuSpar for the anxiety which appears to be most of the concern. Advised she can call myself or clearly on-call service if she needs to speak to anyone. Over the weekend. Patient appeared to be calmer at the end of the visit, and was engaged with the plan.

## 2020-11-22 DIAGNOSIS — J30.2 OTHER SEASONAL ALLERGIC RHINITIS: ICD-10-CM

## 2020-11-28 RX ORDER — LORATADINE 10 MG/1
TABLET ORAL
Qty: 30 TAB | Refills: 0 | Status: SHIPPED | OUTPATIENT
Start: 2020-11-28 | End: 2020-12-07 | Stop reason: SDUPTHER

## 2020-11-28 RX ORDER — SITAGLIPTIN AND METFORMIN HYDROCHLORIDE 50; 1000 MG/1; MG/1
TABLET, FILM COATED, EXTENDED RELEASE ORAL
Qty: 180 TAB | Refills: 0 | Status: SHIPPED | OUTPATIENT
Start: 2020-11-28 | End: 2020-12-07 | Stop reason: SDUPTHER

## 2020-12-07 ENCOUNTER — OFFICE VISIT (OUTPATIENT)
Dept: PRIMARY CARE CLINIC | Age: 65
End: 2020-12-07
Payer: COMMERCIAL

## 2020-12-07 VITALS
WEIGHT: 228 LBS | SYSTOLIC BLOOD PRESSURE: 138 MMHG | BODY MASS INDEX: 36.64 KG/M2 | OXYGEN SATURATION: 98 % | DIASTOLIC BLOOD PRESSURE: 89 MMHG | HEIGHT: 66 IN | HEART RATE: 81 BPM | TEMPERATURE: 98.5 F

## 2020-12-07 DIAGNOSIS — I10 ESSENTIAL (PRIMARY) HYPERTENSION: Chronic | ICD-10-CM

## 2020-12-07 DIAGNOSIS — E03.9 ACQUIRED HYPOTHYROIDISM: ICD-10-CM

## 2020-12-07 DIAGNOSIS — D72.820 LYMPHOCYTOSIS: Chronic | ICD-10-CM

## 2020-12-07 DIAGNOSIS — I48.11 LONGSTANDING PERSISTENT ATRIAL FIBRILLATION (HCC): ICD-10-CM

## 2020-12-07 DIAGNOSIS — E11.40 TYPE 2 DIABETES MELLITUS WITH DIABETIC NEUROPATHY, WITHOUT LONG-TERM CURRENT USE OF INSULIN (HCC): Primary | Chronic | ICD-10-CM

## 2020-12-07 DIAGNOSIS — J30.2 OTHER SEASONAL ALLERGIC RHINITIS: ICD-10-CM

## 2020-12-07 PROCEDURE — 99214 OFFICE O/P EST MOD 30 MIN: CPT | Performed by: FAMILY MEDICINE

## 2020-12-07 RX ORDER — BUSPIRONE HYDROCHLORIDE 5 MG/1
5 TABLET ORAL 3 TIMES DAILY
Qty: 270 TAB | Refills: 1 | Status: SHIPPED | OUTPATIENT
Start: 2020-12-07

## 2020-12-07 RX ORDER — EMPAGLIFLOZIN 25 MG/1
TABLET, FILM COATED ORAL
Qty: 90 TAB | Refills: 1 | Status: SHIPPED | OUTPATIENT
Start: 2020-12-07

## 2020-12-07 RX ORDER — SITAGLIPTIN AND METFORMIN HYDROCHLORIDE 50; 1000 MG/1; MG/1
TABLET, FILM COATED, EXTENDED RELEASE ORAL
Qty: 180 TAB | Refills: 0 | Status: SHIPPED | OUTPATIENT
Start: 2020-12-07

## 2020-12-07 RX ORDER — DULOXETIN HYDROCHLORIDE 20 MG/1
20 CAPSULE, DELAYED RELEASE ORAL 2 TIMES DAILY
Qty: 180 CAP | Refills: 1 | Status: SHIPPED | OUTPATIENT
Start: 2020-12-07

## 2020-12-07 RX ORDER — LISINOPRIL 20 MG/1
20 TABLET ORAL 2 TIMES DAILY
Qty: 180 TAB | Refills: 1 | Status: SHIPPED | OUTPATIENT
Start: 2020-12-07 | End: 2022-05-05

## 2020-12-07 RX ORDER — GABAPENTIN 100 MG/1
CAPSULE ORAL
Qty: 270 CAP | Refills: 1 | Status: SHIPPED | OUTPATIENT
Start: 2020-12-07 | End: 2022-05-05

## 2020-12-07 RX ORDER — LEVOTHYROXINE SODIUM 25 UG/1
TABLET ORAL
COMMUNITY
Start: 2020-11-23 | End: 2020-12-07 | Stop reason: SDUPTHER

## 2020-12-07 RX ORDER — LORATADINE 10 MG/1
TABLET ORAL
Qty: 90 TAB | Refills: 1 | Status: SHIPPED | OUTPATIENT
Start: 2020-12-07

## 2020-12-07 RX ORDER — LEVOTHYROXINE SODIUM 25 UG/1
TABLET ORAL
Qty: 90 TAB | Refills: 1 | Status: SHIPPED | OUTPATIENT
Start: 2020-12-07

## 2020-12-07 NOTE — PROGRESS NOTES
HISTORY OF PRESENT ILLNESS  Copied npte form OV 9/1/2020 below. Diabetes:  DM follow up appt  FBS- not checking     Last HbA1c was 9.1 in 6/2020 down from 10.1/  Says chips is her vise. Was 7.3     Not checking the blood sugar. PT is taking  jardiance 25, metfomin 1000 BID, janivia 100. reports she used to be on  Insulin 70/30 yrs ago bc    [employer] she had to come off it. There is no medication concerns. Has neurological symptoms  And low dose of gabapentin 100 BID is helping. The higher dose made her stupid yrs ago. and cuts heal well. Checks feet at home. cannot cut nails. Her podiatrist no longer takes her insurance. Has chronic fungal infection. Red rash on forehead and cheeks X weeks. No contact chemicals. Hypertension:  PT taking lisinopril 20 BID , sotalol. Bp has been controlled   sees cardiology  Dr Chano Landon    Hypothyroid :    stable on levothyroxine 25mcg. No weight gain or dry skin, constipation. Allergic rhinitis _ OK on Claritin. Asthma controlled. Cardio  Dr Raúl Crespo every 6 months. Has atrial fibrillation  On sotalol  And warfarin. They are considering ablation. Depression:    on Cymbalta and buspar. moving to [place] next month, still disturbed taht she does not want to go. ( lives with a  Family whom is moving). Kenji Rivera is a 60 y.o. female. Past Medical History:   Diagnosis Date    Allergies     Asthma     Cardiac arrhythmia     Diabetes ([address]     Hx of long term use of blood thinners     Hypertension     Thyroid disease      Social History     Tobacco Use    Smoking status: Never Smoker    Smokeless tobacco: Never Used   Substance Use Topics    Alcohol use: No    Drug use: Never       Family History   Problem Relation Age of Onset    Hypertension Other     Diabetes Other     Heart Disease Other        Review of Systems   Constitutional: Negative. Negative for chills, fever and weight loss.    HENT: Negative for congestion and sore throat. Eyes: Negative for blurred vision. Respiratory: Negative for cough. Cardiovascular: Negative for chest pain, palpitations, orthopnea and leg swelling. Gastrointestinal: Negative for abdominal pain, heartburn and nausea. Genitourinary: Negative for dysuria. Musculoskeletal: Negative for falls and myalgias. Skin: Positive for rash (forehead). Neurological: Negative for dizziness, tremors, weakness and headaches. Endo/Heme/Allergies: Positive for environmental allergies. Psychiatric/Behavioral: Negative for depression and suicidal ideas. The patient is nervous/anxious and has insomnia (unisom helps). Visit Vitals  /89 (BP 1 Location: Right arm, BP Patient Position: At rest)   Pulse 81   Temp 98.5 °F (36.9 °C) (Temporal)   Ht 5' 6\" (1.676 m)   Wt 228 lb (103.4 kg)   SpO2 98%   BMI 36.80 kg/m²         Physical Exam  Constitutional:       General: She is not in acute distress. Appearance: Normal appearance. She is obese. She is not ill-appearing. HENT:      Head: Normocephalic and atraumatic. Mouth/Throat:      Mouth: Mucous membranes are moist.   Eyes:      General: No scleral icterus. Extraocular Movements: Extraocular movements intact. Conjunctiva/sclera: Conjunctivae normal.   Cardiovascular:      Rate and Rhythm: Normal rate and regular rhythm. Pulses: Normal pulses. Dorsalis pedis pulses are 2+ on the right side and 2+ on the left side. Heart sounds: Normal heart sounds. Pulmonary:      Effort: Pulmonary effort is normal. No respiratory distress. Breath sounds: Normal breath sounds. No wheezing or rales. Musculoskeletal:      Right lower leg: No edema. Left lower leg: No edema. Feet:      Right foot:      Skin integrity: Dry skin present. No skin breakdown or warmth. Toenail Condition: Right toenails are abnormally thick. Fungal disease present.      Left foot:      Skin integrity: Dry skin present. No skin breakdown or warmth. Lymphadenopathy:      Cervical: No cervical adenopathy. Skin:     General: Skin is warm. Capillary Refill: Capillary refill takes less than 2 seconds. Findings: Erythema and rash present. Rash is papular and scaling. Comments: On forehead , lateral cheeks. Eyebrows scaly   Neurological:      General: No focal deficit present. Mental Status: She is alert and oriented to person, place, and time. Cranial Nerves: No cranial nerve deficit. Psychiatric:         Attention and Perception: Attention normal.         Mood and Affect: Mood normal. Affect is blunt. Speech: Speech is tangential.         Behavior: Behavior normal.         Thought Content: Thought content is not delusional.         Cognition and Memory: Memory normal.           ASSESSMENT and PLAN  Diagnoses and all orders for this visit:    1. Type 2 diabetes mellitus with diabetic neuropathy, without long-term current use of insulin (HCC)  Comments:  Please check FBS. Orders:  -     gabapentin (NEURONTIN) 100 mg capsule; TAKE 1 CAPSULE BY MOUTH THREE TIMES A DAY AS NEEDED  Indications: neuropathic pain, pain  -     Jardiance 25 mg tablet; TAKE 1 TABLET BY MOUTH EVERY DAY  -     CBC WITH AUTOMATED DIFF  -     HEMOGLOBIN A1C WITH EAG  -     METABOLIC PANEL, COMPREHENSIVE    2. Essential (primary) hypertension  Comments: At goal  Orders:  -     lisinopriL (PRINIVIL, ZESTRIL) 20 mg tablet; Take 1 Tab by mouth two (2) times a day. Indications: high blood pressure  -     CBC WITH AUTOMATED DIFF  -     METABOLIC PANEL, COMPREHENSIVE    3. Lymphocytosis  -     CBC WITH AUTOMATED DIFF    4. Acquired hypothyroidism  -     TSH 3RD GENERATION  -     T4, FREE    5. Longstanding persistent atrial fibrillation (HCC)  -     CBC WITH AUTOMATED DIFF    6.  Other seasonal allergic rhinitis  -     loratadine (CLARITIN) 10 mg tablet; TAKE 1 TABLET BY MOUTH EVERY DAY    Other orders  - levothyroxine (SYNTHROID) 25 mcg tablet; TAKE 1 TABLET BY MOUTH EVERY DAY  -     busPIRone (BUSPAR) 5 mg tablet; Take 1 Tab by mouth three (3) times daily. Indications: repeated episodes of anxiety  -     DULoxetine (CYMBALTA) 20 mg capsule; Take 1 Cap by mouth two (2) times a day. Indications: anxiousness associated with depression  -     SITagliptin-metFORMIN (Janumet XR) 50-1,000 mg TM24; TAKE 1 TABLET BY MOUTH TWICE A DAY         Orders Placed This Encounter    CBC WITH AUTOMATED DIFF    HEMOGLOBIN A1C WITH EAG    METABOLIC PANEL, COMPREHENSIVE    TSH 3RD GENERATION    T4, FREE    loratadine (CLARITIN) 10 mg tablet    DISCONTD: levothyroxine (SYNTHROID) 25 mcg tablet    lisinopriL (PRINIVIL, ZESTRIL) 20 mg tablet    levothyroxine (SYNTHROID) 25 mcg tablet    gabapentin (NEURONTIN) 100 mg capsule    Jardiance 25 mg tablet    busPIRone (BUSPAR) 5 mg tablet    DULoxetine (CYMBALTA) 20 mg capsule    SITagliptin-metFORMIN (Janumet XR) 50-1,000 mg TM24     Follow-up and Dispositions    · Return if symptoms worsen or fail to improve, for moving to a deiffent state next month. all the best. .

## 2020-12-08 LAB
ALBUMIN SERPL-MCNC: 3.8 G/DL (ref 3.8–4.8)
ALBUMIN/GLOB SERPL: 1.3 {RATIO} (ref 1.2–2.2)
ALP SERPL-CCNC: 86 IU/L (ref 39–117)
ALT SERPL-CCNC: 11 IU/L (ref 0–32)
AST SERPL-CCNC: 16 IU/L (ref 0–40)
BASOPHILS # BLD AUTO: 0.1 X10E3/UL (ref 0–0.2)
BASOPHILS NFR BLD AUTO: 1 %
BILIRUB SERPL-MCNC: 0.7 MG/DL (ref 0–1.2)
BUN SERPL-MCNC: 10 MG/DL (ref 8–27)
BUN/CREAT SERPL: 18 (ref 12–28)
CALCIUM SERPL-MCNC: 9.1 MG/DL (ref 8.7–10.3)
CHLORIDE SERPL-SCNC: 101 MMOL/L (ref 96–106)
CO2 SERPL-SCNC: 22 MMOL/L (ref 20–29)
CREAT SERPL-MCNC: 0.56 MG/DL (ref 0.57–1)
EOSINOPHIL # BLD AUTO: 0.5 X10E3/UL (ref 0–0.4)
EOSINOPHIL NFR BLD AUTO: 3 %
ERYTHROCYTE [DISTWIDTH] IN BLOOD BY AUTOMATED COUNT: 15 % (ref 11.7–15.4)
EST. AVERAGE GLUCOSE BLD GHB EST-MCNC: 151 MG/DL
GLOBULIN SER CALC-MCNC: 2.9 G/DL (ref 1.5–4.5)
GLUCOSE SERPL-MCNC: 127 MG/DL (ref 65–99)
HBA1C MFR BLD: 6.9 % (ref 4.8–5.6)
HCT VFR BLD AUTO: 47.9 % (ref 34–46.6)
HGB BLD-MCNC: 15.5 G/DL (ref 11.1–15.9)
IMM GRANULOCYTES # BLD AUTO: 0.1 X10E3/UL (ref 0–0.1)
IMM GRANULOCYTES NFR BLD AUTO: 1 %
LYMPHOCYTES # BLD AUTO: 4.5 X10E3/UL (ref 0.7–3.1)
LYMPHOCYTES NFR BLD AUTO: 30 %
MCH RBC QN AUTO: 28.8 PG (ref 26.6–33)
MCHC RBC AUTO-ENTMCNC: 32.4 G/DL (ref 31.5–35.7)
MCV RBC AUTO: 89 FL (ref 79–97)
MONOCYTES # BLD AUTO: 1 X10E3/UL (ref 0.1–0.9)
MONOCYTES NFR BLD AUTO: 7 %
NEUTROPHILS # BLD AUTO: 9 X10E3/UL (ref 1.4–7)
NEUTROPHILS NFR BLD AUTO: 58 %
PLATELET # BLD AUTO: 444 X10E3/UL (ref 150–450)
POTASSIUM SERPL-SCNC: 4.8 MMOL/L (ref 3.5–5.2)
PROT SERPL-MCNC: 6.7 G/DL (ref 6–8.5)
RBC # BLD AUTO: 5.39 X10E6/UL (ref 3.77–5.28)
SODIUM SERPL-SCNC: 138 MMOL/L (ref 134–144)
T4 FREE SERPL-MCNC: 0.98 NG/DL (ref 0.82–1.77)
TSH SERPL DL<=0.005 MIU/L-ACNC: 5.88 UIU/ML (ref 0.45–4.5)
WBC # BLD AUTO: 15.3 X10E3/UL (ref 3.4–10.8)

## 2021-01-15 ENCOUNTER — TELEPHONE (OUTPATIENT)
Dept: PRIMARY CARE CLINIC | Age: 66
End: 2021-01-15

## 2021-01-15 NOTE — TELEPHONE ENCOUNTER
Pt called and said [place] and does not have a pcp and she got a letter from her [employer] company and says that they are not going to be covering her lisinopril, duloxetine, and betamethasone any longer unless they get something from the doctor asking for it to be covered.

## 2021-01-20 NOTE — TELEPHONE ENCOUNTER
I spoke to [employer] and they stated the lisinopril needs a prior auth because of the sig being bid. They stated the other two are fine. It just appear that the pharmacy are trying to fill it to early. Pt states that not what the letter states, it states the insurance company will no longer cover the duloxetine or bethamsone. The insurance company told me something different.  I eufemia do the prior auth for lisinopril and verify with insurance company again

## 2021-01-21 DIAGNOSIS — D72.820 LYMPHOCYTOSIS: Primary | ICD-10-CM

## 2021-01-21 PROBLEM — R79.89 ABNORMAL TSH: Status: ACTIVE | Noted: 2021-01-21

## 2021-01-21 NOTE — PROGRESS NOTES
Abnormal wbc still - efer to hematology. No anemia. A1c 6.9 at goal  Normal liver and kidney function. Thyroid still off/ borderline. IF gaining weight , constipated  We can  Add replacement but I do not recommend.

## 2021-01-22 RX ORDER — FLUOCINONIDE 0.5 MG/G
OINTMENT TOPICAL 2 TIMES DAILY
Qty: 30 G | Refills: 1 | Status: SHIPPED | OUTPATIENT
Start: 2021-01-22

## 2022-03-18 PROBLEM — D72.820 LYMPHOCYTOSIS: Status: ACTIVE | Noted: 2020-09-09

## 2022-03-18 PROBLEM — B35.1 ONYCHOMYCOSIS OF TOENAIL: Status: ACTIVE | Noted: 2020-09-01

## 2022-03-19 PROBLEM — F41.8 DEPRESSION WITH ANXIETY: Status: ACTIVE | Noted: 2020-10-02

## 2022-03-19 PROBLEM — E66.01 SEVERE OBESITY (HCC): Status: ACTIVE | Noted: 2020-09-01

## 2022-03-19 PROBLEM — I48.11 LONGSTANDING PERSISTENT ATRIAL FIBRILLATION (HCC): Status: ACTIVE | Noted: 2020-12-07

## 2022-03-19 PROBLEM — J30.2 OTHER SEASONAL ALLERGIC RHINITIS: Status: ACTIVE | Noted: 2020-09-01

## 2022-03-19 PROBLEM — I10 ESSENTIAL (PRIMARY) HYPERTENSION: Status: ACTIVE | Noted: 2020-09-01

## 2022-03-19 PROBLEM — L21.9 SEBORRHEIC ECZEMA: Status: ACTIVE | Noted: 2020-09-01

## 2022-03-19 PROBLEM — R79.89 ABNORMAL TSH: Status: ACTIVE | Noted: 2021-01-21

## 2022-03-20 PROBLEM — R20.2 PARESTHESIA: Status: ACTIVE | Noted: 2020-09-01

## 2022-04-07 ENCOUNTER — DOCUMENTATION ONLY (OUTPATIENT)
Dept: WOUND CARE | Age: 67
End: 2022-04-07

## 2022-04-07 NOTE — PROGRESS NOTES
Called and spoke with patient in regards to wound care referral rec'vd from Dr. Josh Alex office ([facility]), patient decline to schedule stating wounds are not that bad and will call us if they become worse. Dr. Josh Alex office has been notified of this by fax.

## 2022-05-05 ENCOUNTER — HOSPITAL ENCOUNTER (OUTPATIENT)
Dept: WOUND CARE | Age: 67
Discharge: HOME | End: 2022-05-05
Payer: MEDICAID

## 2022-05-05 VITALS
HEART RATE: 84 BPM | RESPIRATION RATE: 18 BRPM | SYSTOLIC BLOOD PRESSURE: 120 MMHG | DIASTOLIC BLOOD PRESSURE: 73 MMHG | TEMPERATURE: 97.5 F

## 2022-05-05 DIAGNOSIS — L97.222 CALF ULCER, LEFT, WITH FAT LAYER EXPOSED (HCC): Primary | ICD-10-CM

## 2022-05-05 DIAGNOSIS — L97.212 CHRONIC ULCER OF RIGHT CALF WITH FAT LAYER EXPOSED (HCC): ICD-10-CM

## 2022-05-05 PROCEDURE — 97597 DBRDMT OPN WND 1ST 20 CM/<: CPT

## 2022-05-05 PROCEDURE — 74011000250 HC RX REV CODE- 250: Performed by: FAMILY MEDICINE

## 2022-05-05 PROCEDURE — 97598 DBRDMT OPN WND ADDL 20CM/<: CPT

## 2022-05-05 PROCEDURE — 99203 OFFICE O/P NEW LOW 30 MIN: CPT

## 2022-05-05 RX ORDER — LIDOCAINE HYDROCHLORIDE 20 MG/ML
JELLY TOPICAL ONCE
Status: CANCELLED | OUTPATIENT
Start: 2022-05-05 | End: 2022-05-05

## 2022-05-05 RX ORDER — GENTAMICIN SULFATE 1 MG/G
OINTMENT TOPICAL ONCE
Status: CANCELLED | OUTPATIENT
Start: 2022-05-05 | End: 2022-05-05

## 2022-05-05 RX ORDER — LIDOCAINE HYDROCHLORIDE 40 MG/ML
SOLUTION TOPICAL ONCE
Status: CANCELLED | OUTPATIENT
Start: 2022-05-05 | End: 2022-05-05

## 2022-05-05 RX ORDER — BACITRACIN 500 [USP'U]/G
OINTMENT TOPICAL ONCE
Status: CANCELLED | OUTPATIENT
Start: 2022-05-05 | End: 2022-05-05

## 2022-05-05 RX ORDER — CLOBETASOL PROPIONATE 0.5 MG/G
OINTMENT TOPICAL ONCE
Status: CANCELLED | OUTPATIENT
Start: 2022-05-05 | End: 2022-05-05

## 2022-05-05 RX ORDER — LIDOCAINE 50 MG/G
OINTMENT TOPICAL ONCE
Status: CANCELLED | OUTPATIENT
Start: 2022-05-05 | End: 2022-05-05

## 2022-05-05 RX ORDER — GABAPENTIN 300 MG/1
300 CAPSULE ORAL
COMMUNITY

## 2022-05-05 RX ORDER — LIDOCAINE 40 MG/G
CREAM TOPICAL ONCE
Status: CANCELLED | OUTPATIENT
Start: 2022-05-05 | End: 2022-05-05

## 2022-05-05 RX ORDER — MUPIROCIN 20 MG/G
OINTMENT TOPICAL ONCE
Status: CANCELLED | OUTPATIENT
Start: 2022-05-05 | End: 2022-05-05

## 2022-05-05 RX ORDER — TRIAMCINOLONE ACETONIDE 1 MG/G
OINTMENT TOPICAL ONCE
Status: CANCELLED | OUTPATIENT
Start: 2022-05-05 | End: 2022-05-05

## 2022-05-05 RX ORDER — AMLODIPINE BESYLATE 5 MG/1
5 TABLET ORAL DAILY
COMMUNITY

## 2022-05-05 RX ORDER — AMLODIPINE AND BENAZEPRIL HYDROCHLORIDE 5; 10 MG/1; MG/1
1 CAPSULE ORAL DAILY
COMMUNITY

## 2022-05-05 RX ORDER — OXYBUTYNIN CHLORIDE 5 MG/1
5 TABLET ORAL 2 TIMES DAILY
COMMUNITY

## 2022-05-05 RX ADMIN — Medication: at 13:44

## 2022-05-05 NOTE — PROGRESS NOTES
Wound Center  Progress Note / Procedure Note    Subjective:     Chief Complaint:  Genoveva Sam is a 73 y.o.  female  with leg wounds of >few months duration. Referred by:  Dr Risa Goodman    HPI:     Legs weeping and swelling  Saw Dr Shy Barbosa  Had studies  Didn't wish to come here so Dr Aamir Ron had been applying unnas in his office  Referred hre as wound on right getting worse    History/Chart/Medications reviewed    Wound caused by: swelling  Current wound care:See flowsheet  Offloading wound: yes  Appetite: good  Wound associated pain: See flowsheet  Diabetic: yes  Smoker: no  ROS: no N/V/D, no T/chills; no local rash, no chest pain or shortness of breath, no headache or dizzyness    Review of Systems:  Constitutional: Negative    HENT: Negative. Eyes: Negative. Respiratory: Negative. Cardiovascular: Negative. Gastrointestinal: Negative. Genitourinary: Negative. Musculoskeletal: Negative. Skin: Wounds  Neurological: Negative. Endo/Heme/Allergies: Negative. Psychiatric/Behavioral: Negative. Objective:     Physical Exam:   See flowsheet / nursing notes for vitals  Visit Vitals  /73 (BP 1 Location: Right upper arm, BP Patient Position: Sitting)   Pulse 84   Temp 97.5 °F (36.4 °C)   Resp 18     General: NAD. Hygiene good  Psych: cooperative. No anxiety or depression. Normal mood and affect. Neuro: alert and oriented to person/place/situation. Otherwise nonfocal.  Derm: Normal  turgor for age, dry skin  HEENT: Normocephalic, atraumatic. EOMI. Conjunctiva clear. No scleral icterus. Neck: Normal range of motion. No masses. Chest: Respirations nonlabored  Lower extremities: color normal; temperature normal. Hair growth is not present. Calves are supple, nontender, approximately equally sized in comparison.  Capillary refill <3 sec  Focussed Lower Extremity Exam:  Vascular exam:  Right lower extremity: moderate  edema, foot warm,   DP pulse : 1+  PT pulse: 0  Nails dystrophic   Left lower extremity: moderate  edema, foot warm,   DP pulse : 1+  PT pulse: 0   Nails dystrophic    Ulcer Description:   See Flowsheet         Data Review:              Assessment/Plan     73 y.o. female with diabetes, chronic leg edema    -R/ leg ulcer. Full thickness- small area, primarily partial thickness  clustered  Slough/granular  Necessitates debridement  for wound healing and to prevent/heal infection  See below    -L/ leg ulcer. Full thickness/partial thickness mix      - leg edema  Had been doing unnas at Dr Richa Cunningham office  Has new recliner now so can elevate    Venous insuff  Get recrods from Dr Jeraldine Scheuermann office    Dm2  Poor control per patient      Following discussed with patient   Needs :  Serial debridement- debrided today- see note below    Good local wound care  Dressing: Adaptic, aquacel  Frequency : once weekly      -Edema management  Remove dressing prior to showering  Do not get dressing wet    Edema management:  Elevate leg(s) throughout the day starting in the morning  Compression: unna lite 2 layer  Avoid prolonged standing      -Good Diabetic control    -Good offloading    Patient/ understood and agrees with plan. Questions answered. Serial visits and serial debridements also discussed. Follow up with me in 1 week        Procedure:     Ulcer assessment: Due to presence of necrotic tissue within the wound bed, ulcer requires debridement. Procedure: Debridement:   The indication for debridement was reviewed with patient. Risks of procedure (bleeding, infection, pain) were discussed with patient and consent signed on first visit.  Questions were answered      Selective debridement Right leg ulcer  Indication: to remove necrotic tissue/ vitalized and devitalized tissue/ infected tissue through skin  layer of wound bed  Time out done  Consent in chart   Anesthesia: Topical  lidocaine   Instrument:  Blade  Residual Necrosis: Present and scored   Bleeding: <1ml   Hemostasis: Pressure   Patient tolerated procedure well   Procedural Pain: 0  Post - procedural pain: 0    Post debridement measurements: see below  Surface area debrided: 80 sq.  Cm     WOUND POA CONDITIONS    Wound Leg lower Right circumferential, #1 (Active)   Wound Image    05/05/22 1338   Wound Etiology Venous 05/05/22 1338   Cleansed Soap and water 05/05/22 1338   Wound Length (cm) 14 cm 05/05/22 1338   Wound Width (cm) 29 cm 05/05/22 1338   Wound Depth (cm) 0.2 cm 05/05/22 1338   Wound Surface Area (cm^2) 406 cm^2 05/05/22 1338   Wound Volume (cm^3) 81.2 cm^3 05/05/22 1338   Post-Procedure Length (cm) 14 cm 05/05/22 1415   Post-Procedure Width (cm) 29 cm 05/05/22 1415   Post-Procedure Depth (cm) 0.3 cm 05/05/22 1415   Post-Procedure Surface Area (cm^2) 406 cm^2 05/05/22 1415   Post-Procedure Volume (cm^3) 121.8 cm^3 05/05/22 1415   Wound Assessment Slough;Pink/red 05/05/22 1338   Drainage Amount Moderate 05/05/22 1338   Drainage Description Serosanguinous 05/05/22 1338   Wound Odor None 05/05/22 1338   Buffy-Wound/Incision Assessment Maceration;Blanchable erythema;Edematous 05/05/22 1338   Edges Flat/open edges 05/05/22 1338   Number of days: 0       Wound Leg lower Left;Lateral Cluster #2 (Active)   Wound Image   05/05/22 1338   Wound Etiology Venous 05/05/22 1338   Cleansed Soap and water 05/05/22 1338   Wound Length (cm) 7.8 cm 05/05/22 1338   Wound Width (cm) 3.5 cm 05/05/22 1338   Wound Depth (cm) 0.2 cm 05/05/22 1338   Wound Surface Area (cm^2) 27.3 cm^2 05/05/22 1338   Wound Volume (cm^3) 5.46 cm^3 05/05/22 1338   Wound Assessment Slough;Pink/red 05/05/22 1338   Drainage Amount Moderate 05/05/22 1338   Drainage Description Serosanguinous 05/05/22 1338   Wound Odor None 05/05/22 1338   Buffy-Wound/Incision Assessment Blanchable erythema;Edematous 05/05/22 1338   Edges Flat/open edges 05/05/22 1338   Number of days: 0               -------    Past Medical History:   Diagnosis Date    Allergies     Asthma     Cardiac arrhythmia     [address] [address]     Hx of long term use of blood thinners     Hypertension     Thyroid disease       Past Surgical History:   Procedure Laterality Date    HX  SECTION      HX GYN       Family History   Problem Relation Age of Onset    Hypertension Other     Diabetes Other     Heart Disease Other       Social History     Tobacco Use    Smoking status: Never Smoker    Smokeless tobacco: Never Used   Substance Use Topics    Alcohol use: No       Prior to Admission medications    Medication Sig Start Date End Date Taking? Authorizing Provider   gabapentin (NEURONTIN) 300 mg capsule Take 300 mg by mouth nightly. Take 1 capsule by mouth every day at bedtime, and 1 capsule during the day as needed   Yes Provider, Historical   amLODIPine-benazepril (LOTREL) 5-10 mg per capsule Take 1 Capsule by mouth daily. Yes Provider, Historical   amLODIPine (NORVASC) 5 mg tablet Take 5 mg by mouth daily. Yes Provider, Historical   oxybutynin (DITROPAN) 5 mg tablet Take 5 mg by mouth two (2) times a day. Yes Provider, Historical   fluocinoNIDE (LIDEX) 0.05 % ointment Apply  to affected area two (2) times a day. 21   Brendan Arenas MD   loratadine (CLARITIN) 10 mg tablet TAKE 1 TABLET BY MOUTH EVERY DAY 20   Brendan Arenas MD   levothyroxine (SYNTHROID) 25 mcg tablet TAKE 1 TABLET BY MOUTH EVERY DAY  Patient taking differently: 50 mcg. TAKE 1 TABLET BY MOUTH EVERY DAY 20   Brendan Arenas MD   Jardiance 25 mg tablet TAKE 1 TABLET BY MOUTH EVERY DAY 20   Brendan Arenas MD   busPIRone (BUSPAR) 5 mg tablet Take 1 Tab by mouth three (3) times daily. Indications: repeated episodes of anxiety 20   Brendan Arenas MD   DULoxetine (CYMBALTA) 20 mg capsule Take 1 Cap by mouth two (2) times a day. Indications: anxiousness associated with depression  Patient taking differently: Take 30 mg by mouth two (2) times a day.  Indications: anxiousness associated with depression 20 Daryl Gerard MD   SITagliptin-metFORMIN (Janumet XR) 50-1,000 mg TM24 TAKE 1 TABLET BY MOUTH TWICE A DAY 12/7/20   Daryl Gerard MD   warfarin (COUMADIN) 2 mg tablet Take 6 mg by mouth daily. 8/14/20   Provider, Historical   sotaloL (BETAPACE) 80 mg tablet Take 80 mg by mouth two (2) times a day. 8/3/20   Provider, Historical   cyclobenzaprine (FLEXERIL) 10 mg tablet Take 10 mg by mouth three (3) times daily. 6/11/20   Provider, Historical   clotrimazole (LOTRIMIN) 1 % topical cream Apply  to affected area two (2) times a day. On FEET 9/1/20   Daryl Gerard MD   betamethasone dipropionate (DIPROSONE) 0.05 % topical cream Apply  to affected area two (2) times daily as needed for Skin Irritation. Of face 9/1/20   Daryl Gerard MD   albuterol (PROVENTIL HFA, VENTOLIN HFA, PROAIR HFA) 90 mcg/actuation inhaler Take 2 Puffs by inhalation every four (4) hours as needed for Wheezing.     Other, MD Nahid     Allergies   Allergen Reactions    Cephalexin Unknown (comments)    Sulfa (Sulfonamide Antibiotics) Unknown (comments)    Zofran [Ondansetron Hcl] Unknown (comments)          Signed By: Isamar Rivas MD     May 5, 2022

## 2022-05-05 NOTE — WOUND CARE
05/05/22 1338   Anesthetic   Anesthetic 4% Lidocaine Liquid Topical   Right Leg Edema Point of Measurement   Leg circumference 41.5 cm   Ankle circumference 25 cm   Left Leg Edema Point of Measurement   Leg circumference 48.5 cm   Ankle circumference 27 cm   LLE Peripheral Vascular    Capillary Refill Greater than 3 seconds   Color Pink   Temperature Cool   Pedal Pulse Doppler   RLE Peripheral Vascular    Capillary Refill Greater than 3 seconds   Color Pink   Temperature Cool   Pedal Pulse Doppler   Wound Leg lower Right circumferential, #1   Date First Assessed/Time First Assessed: 05/05/22 1336   Present on Hospital Admission: Yes  Location: Leg lower  Wound Location Orientation: Right  Wound Description: circumferential, #1   Wound Image     Wound Etiology Venous   Cleansed Soap and water   Wound Length (cm) 14 cm   Wound Width (cm) 29 cm   Wound Depth (cm) 0.2 cm   Wound Surface Area (cm^2) 406 cm^2   Wound Volume (cm^3) 81.2 cm^3   Wound Assessment Slough;Pink/red   Drainage Amount Moderate   Drainage Description Serosanguinous   Wound Odor None   Buffy-Wound/Incision Assessment Maceration;Blanchable erythema;Edematous   Edges Flat/open edges   Wound Leg lower Left;Lateral Cluster #2   Date First Assessed/Time First Assessed: 05/05/22 1338   Present on Hospital Admission: Yes  Location: Leg lower  Wound Location Orientation: Left;Lateral  Wound Description: Cluster #2   Wound Image    Wound Etiology Venous   Cleansed Soap and water   Wound Length (cm) 7.8 cm   Wound Width (cm) 3.5 cm   Wound Depth (cm) 0.2 cm   Wound Surface Area (cm^2) 27.3 cm^2   Wound Volume (cm^3) 5.46 cm^3   Wound Assessment Slough;Pink/red   Drainage Amount Moderate   Drainage Description Serosanguinous   Wound Odor None   Buffy-Wound/Incision Assessment Blanchable erythema;Edematous  (dried exudate)   Edges Flat/open edges     Visit Vitals  /73 (BP 1 Location: Right upper arm, BP Patient Position: Sitting)   Pulse 84   Temp 97.5 °F (36.4 °C)   Resp 18

## 2022-05-12 ENCOUNTER — HOSPITAL ENCOUNTER (OUTPATIENT)
Dept: WOUND CARE | Age: 67
Discharge: HOME | End: 2022-05-12
Payer: MEDICAID

## 2022-05-12 VITALS
DIASTOLIC BLOOD PRESSURE: 83 MMHG | SYSTOLIC BLOOD PRESSURE: 135 MMHG | TEMPERATURE: 97.5 F | RESPIRATION RATE: 18 BRPM | HEART RATE: 79 BPM

## 2022-05-12 DIAGNOSIS — L97.212 CHRONIC ULCER OF RIGHT CALF WITH FAT LAYER EXPOSED (HCC): ICD-10-CM

## 2022-05-12 DIAGNOSIS — L97.222 CALF ULCER, LEFT, WITH FAT LAYER EXPOSED (HCC): Primary | ICD-10-CM

## 2022-05-12 PROCEDURE — 11042 DBRDMT SUBQ TIS 1ST 20SQCM/<: CPT

## 2022-05-12 PROCEDURE — 74011000250 HC RX REV CODE- 250: Performed by: FAMILY MEDICINE

## 2022-05-12 RX ORDER — LIDOCAINE HYDROCHLORIDE 20 MG/ML
JELLY TOPICAL ONCE
Status: CANCELLED | OUTPATIENT
Start: 2022-05-12 | End: 2022-05-12

## 2022-05-12 RX ORDER — MUPIROCIN 20 MG/G
OINTMENT TOPICAL ONCE
Status: CANCELLED | OUTPATIENT
Start: 2022-05-12 | End: 2022-05-12

## 2022-05-12 RX ORDER — BACITRACIN 500 [USP'U]/G
OINTMENT TOPICAL ONCE
Status: CANCELLED | OUTPATIENT
Start: 2022-05-12 | End: 2022-05-12

## 2022-05-12 RX ORDER — GENTAMICIN SULFATE 1 MG/G
OINTMENT TOPICAL ONCE
Status: CANCELLED | OUTPATIENT
Start: 2022-05-12 | End: 2022-05-12

## 2022-05-12 RX ORDER — TRIAMCINOLONE ACETONIDE 1 MG/G
OINTMENT TOPICAL ONCE
Status: CANCELLED | OUTPATIENT
Start: 2022-05-12 | End: 2022-05-12

## 2022-05-12 RX ORDER — CLOBETASOL PROPIONATE 0.5 MG/G
OINTMENT TOPICAL ONCE
Status: CANCELLED | OUTPATIENT
Start: 2022-05-12 | End: 2022-05-12

## 2022-05-12 RX ORDER — LIDOCAINE HYDROCHLORIDE 40 MG/ML
SOLUTION TOPICAL ONCE
Status: CANCELLED | OUTPATIENT
Start: 2022-05-12 | End: 2022-05-12

## 2022-05-12 RX ORDER — LIDOCAINE 40 MG/G
CREAM TOPICAL ONCE
Status: CANCELLED | OUTPATIENT
Start: 2022-05-12 | End: 2022-05-12

## 2022-05-12 RX ORDER — LIDOCAINE 50 MG/G
OINTMENT TOPICAL ONCE
Status: CANCELLED | OUTPATIENT
Start: 2022-05-12 | End: 2022-05-12

## 2022-05-12 RX ADMIN — Medication: at 10:29

## 2022-05-12 NOTE — WOUND CARE
05/12/22 1019   Anesthetic   Anesthetic 4% Lidocaine Liquid Topical   Right Leg Edema Point of Measurement   Leg circumference 47 cm   Ankle circumference 27 cm   Left Leg Edema Point of Measurement   Leg circumference 43 cm   Ankle circumference 27 cm   LLE Peripheral Vascular    Capillary Refill Less than/equal to 3 seconds   Color Appropriate for race   Temperature Warm   Pedal Pulse Palpable   RLE Peripheral Vascular    Capillary Refill Less than/equal to 3 seconds   Color Pink   Temperature Warm   Pedal Pulse Palpable   Wound Foot Left;Plantar #3   Date First Assessed/Time First Assessed: 05/12/22 1020   Present on Hospital Admission: Yes  Primary Wound Type: Blister/bullae  Location: Foot  Wound Location Orientation: Left;Plantar  Wound Description: #3   Wound Image    Dressing Status Other (Comment)  (large amount off drainage from proximal wound)   Cleansed Soap and water   Offloading for Diabetic Foot Ulcers Offloading not required   Wound Length (cm) 5 cm   Wound Width (cm) 3.5 cm   Wound Depth (cm) 0.1 cm   Wound Surface Area (cm^2) 17.5 cm^2   Wound Volume (cm^3) 1.75 cm^3   Wound Assessment Other (Comment)  (blistser)   Drainage Amount None   Wound Odor None   Buffy-Wound/Incision Assessment Maceration   Edges Undefined edges   Wound Leg lower Right circumferential, #1   Date First Assessed/Time First Assessed: 05/05/22 1336   Present on Hospital Admission: Yes  Location: Leg lower  Wound Location Orientation: Right  Wound Description: circumferential, #1   Wound Image     Dressing Status Breakthrough drainage noted; Old drainage noted   Cleansed Soap and water   Offloading for Diabetic Foot Ulcers Offloading not required   Wound Length (cm) 13.7 cm   Wound Width (cm) 28 cm   Wound Depth (cm) 0.2 cm   Wound Surface Area (cm^2) 383.6 cm^2   Change in Wound Size % 5.52   Wound Volume (cm^3) 76.72 cm^3   Wound Healing % 6   Wound Assessment Slough;Pink/red   Drainage Amount Large   Drainage Description Yellow;Serosanguinous   Wound Odor Moderate   Buffy-Wound/Incision Assessment Maceration   Edges Undefined edges   Wound Leg lower Left;Lateral Cluster #2   Date First Assessed/Time First Assessed: 05/05/22 1338   Present on Hospital Admission: Yes  Location: Leg lower  Wound Location Orientation: Left;Lateral  Wound Description: Cluster #2   Wound Image    Dressing Status Clean;Dry; Intact   Cleansed Soap and water   Offloading for Diabetic Foot Ulcers Offloading not required   Wound Length (cm) 0.1 cm   Wound Width (cm) 0.1 cm   Wound Depth (cm) 0.1 cm   Wound Surface Area (cm^2) 0.01 cm^2   Change in Wound Size % 99.96   Wound Volume (cm^3) 0.001 cm^3   Wound Healing % 100   Wound Assessment Epithelialization   Drainage Amount None   Wound Odor None   Buffy-Wound/Incision Assessment Intact   Edges Attached edges     Visit Vitals  /83 (BP 1 Location: Right upper arm, BP Patient Position: At rest)   Pulse 79   Temp 97.5 °F (36.4 °C)   Resp 18

## 2022-05-12 NOTE — WOUND CARE
05/12/22 1054   Right Leg Edema Point of Measurement   Compression Therapy 2 layer compression wrap   Left Leg Edema Point of Measurement   Compression Therapy Tubular elastic support bandage   Wound Foot Left;Plantar #3   Date First Assessed/Time First Assessed: 05/12/22 1020   Present on Hospital Admission: Yes  Primary Wound Type: Blister/bullae  Location: Foot  Wound Location Orientation: Left;Plantar  Wound Description: #3   Dressing Status New dressing applied   Dressing/Treatment Betadine swabs/Povidone Iodine;Gauze dressing/dressing sponge;Roll gauze   Wound Leg lower Right circumferential, #1   Date First Assessed/Time First Assessed: 05/05/22 1336   Present on Hospital Admission: Yes  Location: Leg lower  Wound Location Orientation: Right  Wound Description: circumferential, #1   Dressing Status New dressing applied   Dressing/Treatment Petroleum gauze; Alginate; Other (Comment)  (drawtex)   Discharge Condition: Stable     Pain: 8    Ambulatory Status: Walking    Discharge Destination: Home     Transportation: Taxi    Accompanied by: Self     Discharge instructions reviewed with Patient and copy or written instructions have been provided. All questions/concerns have been addressed at this time. Multilayer Compression Wrap   (Not Unna) Below the Knee    NAME:  Joellen Campbell OF BIRTH:  1955  MEDICAL RECORD NUMBER:  [id number]  DATE:  5/12/2022    Removed old Multilayer wrap if indicated and wash leg with mild soap/water. Applied moisturizing agent to dry skin as needed. Applied primary and secondary dressing as ordered. Applied multilayered dressing below the knee to right lower leg. Instructed patient/caregiver not to remove dressing and to keep it clean and dry. Instructed patient/caregiver on complications to report to provider, such as pain, numbness in toes, heavy drainage, and slippage of dressing.   Instructed patient on purpose of compression dressing and on activity and exercise recommendations.     Response to treatment: Patient tolerated treatment with moderate discomfort but relieved after procedure was completed      Electronically signed by Tian Meek on 5/12/2022 at 11:20 AM

## 2022-05-12 NOTE — PROGRESS NOTES
Wound Center  Progress Note / Procedure Note    Subjective:     Chief Complaint:  Mariella Bone is a 73 y.o.  female  with leg wounds of >few months duration. HPI:     Legs weeping and swelling  Saw Dr Gissel Tipton  Had studies  Didn't wish to come here so Dr Choi Parents had been applying unnas in his office  Referred hre as wound on right getting worse    History/Chart/Medications reviewed    Since last visit:  ;riht leg weeping ++  Recliner won't stay up so didn't elevate as much as she'd hoped      Wound caused by: swelling  Current wound care:See flowsheet  Offloading wound: yes  Appetite: good  Wound associated pain: See flowsheet  Diabetic: yes  Smoker: no  ROS: no N/V/D, no T/chills; no local rash, no chest pain or shortness of breath, no headache or dizzyness      Objective:     Physical Exam:   See flowsheet / nursing notes for vitals  Visit Vitals  /83 (BP 1 Location: Right upper arm, BP Patient Position: At rest)   Pulse 79   Temp 97.5 °F (36.4 °C)   Resp 18     General: NAD. Hygiene good  Psych: cooperative. No anxiety or depression. Normal mood and affect. Neuro: alert and oriented to person/place/situation. Otherwise nonfocal.  Derm: Normal  turgor for age, dry skin  HEENT: Normocephalic, atraumatic. EOMI. Conjunctiva clear. No scleral icterus. Neck: Normal range of motion. No masses. Chest: Respirations nonlabored  Lower extremities: color normal; temperature normal. Hair growth is not present. Calves are supple, nontender, approximately equally sized in comparison.  Capillary refill <3 sec  Focussed Lower Extremity Exam:  Vascular exam:  Right lower extremity: moderate  edema, foot warm,   DP pulse : 1+  PT pulse: 0  Nails dystrophic   Left lower extremity: moderate  edema, foot warm,   DP pulse : 1+  PT pulse: 0   Nails dystrophic    Ulcer Description:   See Flowsheet         Data Review:              Assessment/Plan     73 y.o. female with diabetes, chronic leg edema    -R/ leg ulcer.  Full thickness- small area, primarily partial thickness  clustered  Slough/granular  Necessitates debridement  for wound healing and to prevent/heal infection  See below    -L/ leg ulcer. Healed  Apply A& D, tubi x 2    Macerated spot on left plantar and entire right plnatar-  Betadine wet to dry      - leg edema      Venous insuff  Get recrods from Dr Jeanna Patel office    Dm2  Poor control per patient      Following discussed with patient   Needs :  Serial debridement- debrided today- see note below    Good local wound care  R/ leg  Dressing: Adaptic, aquacel  Frequency : 3x/week  ORDER HH nurse      -Edema management  Remove dressing prior to showering  Do not get dressing wet    Edema management:  Elevate leg(s) throughout the day starting in the morning  Compression: unna lite 2 layer  Avoid prolonged standing      -Good Diabetic control    -Good offloading    Patient/ understood and agrees with plan. Questions answered. Serial visits and serial debridements also discussed. Follow up with me in 1 week        Procedure:     Ulcer assessment: Due to presence of necrotic tissue within the wound bed, ulcer requires debridement. Procedure: Debridement:   The indication for debridement was reviewed with patient. Risks of procedure (bleeding, infection, pain) were discussed with patient and consent signed on first visit. Questions were answered      Subcutaneous debridement Right leg ulcer  Indication: to remove necrotic tissue/ vitalized and devitalized tissue/ infected tissue through skin and subcutaneous  layer of wound bed  Time out done  Consent in chart   Anesthesia: Topical  lidocaine   Instrument:  curette  Residual Necrosis: Present and scored   Bleeding: <1ml   Hemostasis: Pressure   Patient tolerated procedure well   Procedural Pain: 0  Post - procedural pain: 0    Post debridement measurements: see below  Surface area debrided: <20 sq.  Cm     Wound Leg lower Right circumferential, #1 (Active) Wound Image    05/12/22 1019   Wound Etiology Venous 05/05/22 1338   Dressing Status New dressing applied 05/12/22 1054   Cleansed Soap and water 05/12/22 1019   Dressing/Treatment Petroleum gauze; Alginate; Other (Comment) 05/12/22 1054   Offloading for Diabetic Foot Ulcers Offloading not required 05/12/22 1019   Wound Length (cm) 13.7 cm 05/12/22 1019   Wound Width (cm) 28 cm 05/12/22 1019   Wound Depth (cm) 0.2 cm 05/12/22 1019   Wound Surface Area (cm^2) 383.6 cm^2 05/12/22 1019   Change in Wound Size % 5.52 05/12/22 1019   Wound Volume (cm^3) 76.72 cm^3 05/12/22 1019   Wound Healing % 6 05/12/22 1019   Post-Procedure Length (cm) 13.7 cm 05/12/22 1046   Post-Procedure Width (cm) 28 cm 05/12/22 1046   Post-Procedure Depth (cm) 0.4 cm 05/12/22 1046   Post-Procedure Surface Area (cm^2) 383.6 cm^2 05/12/22 1046   Post-Procedure Volume (cm^3) 153.44 cm^3 05/12/22 1046   Wound Assessment Slough;Pink/red 05/12/22 1019   Drainage Amount Large 05/12/22 1019   Drainage Description Yellow;Serosanguinous 05/12/22 1019   Wound Odor Moderate 05/12/22 1019   Buffy-Wound/Incision Assessment Maceration 05/12/22 1019   Edges Undefined edges 05/12/22 1019   Number of days: 7       Wound Foot Left;Plantar #3 (Active)   Wound Image   05/12/22 1019   Dressing Status New dressing applied 05/12/22 1054   Cleansed Soap and water 05/12/22 1019   Dressing/Treatment Betadine swabs/Povidone Iodine;Gauze dressing/dressing sponge;Roll gauze 05/12/22 1054   Offloading for Diabetic Foot Ulcers Offloading not required 05/12/22 1019   Wound Length (cm) 5 cm 05/12/22 1019   Wound Width (cm) 3.5 cm 05/12/22 1019   Wound Depth (cm) 0.1 cm 05/12/22 1019   Wound Surface Area (cm^2) 17.5 cm^2 05/12/22 1019   Wound Volume (cm^3) 1.75 cm^3 05/12/22 1019   Wound Assessment Other (Comment) 05/12/22 1019   Drainage Amount None 05/12/22 1019   Wound Odor None 05/12/22 1019   Buffy-Wound/Incision Assessment Maceration 05/12/22 1019   Edges Undefined edges 22 1019   Number of days: 0          -------    Past Medical History:   Diagnosis Date    Allergies     Asthma     Cardiac arrhythmia     Diabetes ([address]     Hx of long term use of blood thinners     Hypertension     Thyroid disease       Past Surgical History:   Procedure Laterality Date    HX  SECTION      HX GYN       Family History   Problem Relation Age of Onset    Hypertension Other     Diabetes Other     Heart Disease Other       Social History     Tobacco Use    Smoking status: Never Smoker    Smokeless tobacco: Never Used   Substance Use Topics    Alcohol use: No       Prior to Admission medications    Medication Sig Start Date End Date Taking? Authorizing Provider   gabapentin (NEURONTIN) 300 mg capsule Take 300 mg by mouth nightly. Take 1 capsule by mouth every day at bedtime, and 1 capsule during the day as needed    Provider, Historical   amLODIPine-benazepril (LOTREL) 5-10 mg per capsule Take 1 Capsule by mouth daily. Provider, Historical   amLODIPine (NORVASC) 5 mg tablet Take 5 mg by mouth daily. Provider, Historical   oxybutynin (DITROPAN) 5 mg tablet Take 5 mg by mouth two (2) times a day. Provider, Historical   fluocinoNIDE (LIDEX) 0.05 % ointment Apply  to affected area two (2) times a day. 21   Charity Thorne MD   loratadine (CLARITIN) 10 mg tablet TAKE 1 TABLET BY MOUTH EVERY DAY 20   Charity Thorne MD   levothyroxine (SYNTHROID) 25 mcg tablet TAKE 1 TABLET BY MOUTH EVERY DAY  Patient taking differently: 50 mcg. TAKE 1 TABLET BY MOUTH EVERY DAY 20   Charity Thorne MD   Jardiance 25 mg tablet TAKE 1 TABLET BY MOUTH EVERY DAY 20   Charity Thorne MD   busPIRone (BUSPAR) 5 mg tablet Take 1 Tab by mouth three (3) times daily. Indications: repeated episodes of anxiety 20   Charity Thorne MD   DULoxetine (CYMBALTA) 20 mg capsule Take 1 Cap by mouth two (2) times a day.  Indications: anxiousness associated with depression  Patient taking differently: Take 30 mg by mouth two (2) times a day. Indications: anxiousness associated with depression 12/7/20   Ethan Almanza MD   SITagliptin-metFORMIN (Janumet XR) 50-1,000 mg TM24 TAKE 1 TABLET BY MOUTH TWICE A DAY 12/7/20   Ethan Almanza MD   warfarin (COUMADIN) 2 mg tablet Take 6 mg by mouth daily. 8/14/20   Provider, Historical   sotaloL (BETAPACE) 80 mg tablet Take 80 mg by mouth two (2) times a day. 8/3/20   Provider, Historical   cyclobenzaprine (FLEXERIL) 10 mg tablet Take 10 mg by mouth three (3) times daily. 6/11/20   Provider, Historical   clotrimazole (LOTRIMIN) 1 % topical cream Apply  to affected area two (2) times a day. On FEET 9/1/20   Ethan Almanza MD   betamethasone dipropionate (DIPROSONE) 0.05 % topical cream Apply  to affected area two (2) times daily as needed for Skin Irritation. Of face 9/1/20   Ethan Almanza MD   albuterol (PROVENTIL HFA, VENTOLIN HFA, PROAIR HFA) 90 mcg/actuation inhaler Take 2 Puffs by inhalation every four (4) hours as needed for Wheezing.     Other, MD Nahid     Allergies   Allergen Reactions    Cephalexin Unknown (comments)    Sulfa (Sulfonamide Antibiotics) Unknown (comments)    Zofran [Ondansetron Hcl] Unknown (comments)          Signed By: Sam Sheth MD     May 12, 2022

## 2022-05-12 NOTE — DISCHARGE INSTRUCTIONS
Discharge Instructions for  [facility]  [address] [place], [address] [facility]  Telephone: [phone]    [address] [place] [address] Information: Should you experience any significant changes in your wound(s) or have questions about your wound care, please contact the [facility] at [address] [place] [address] Street 8:00 am - 4:30. If you need help with your wound outside these hours and cannot wait until we are again available, contact your PCP or go to the hospital emergency room. NAME:  Claudette Irene  YOB: 1955  DATE:  5/5/2022    : Rakan Tse     []     Wound Cleansing:   Do not scrub or use excessive force. Cleanse wound prior to applying a clean dressing with:   [x] Keep Wound Dry in Shower - may purchase a cast cover at local pharmacy or sponge bath    [] Cleanse wound with Mild Soap & Water    [] May Shower at Discharge: remove dressing 1st, redress wound right after with a new dressing  [] Do not shower  [x] cleanse with baby shampoo lather leave 2-3 then rinse with water on dressing changes     Topical Treatments:  Do not apply lotions, creams, or ointments to wound bed unless directed. [x] Apply moisturizing lotion A&D ointment to skin surrounding the wound prior to dressing change. [] Other:     Dressings:           Wound Location Right and Left lower legs     Apply Primary Dressing:      [x] Adaptic, Aquacel Ag     Cover and Secure with:  [] Gauze [x] ABD [] exudry     [] Ajay [] Kerlix [] Mepilex Border  [] Ace Wrap [] Roll Tape    [x] Other: 2 layer Milikan calamine LITE     Change dressing:    [] Every Other Day   [] Three times per week  [x] Once a week   [x] Do Not Change Dressing     [] Other:     Edema Control: Every morning immediately when getting up should be applied to affected leg(s) from mid foot to knee making sure to cover the heel. Remove every night before going to bed if desired.   Apply: [] Compression Stocking      []Right Leg           []Left Leg   [] Tubigrip {tubigrip:78295}   [] Right Leg Single Layer  [] Right Leg Double Layer                              [] Left Leg Single Layer [] Left Leg Double Layer      Compression: Do not get leg(s) with wrap wet. If wraps become too tight call the center or completely remove the wrap. Apply: [] Three Layer Compression Wrap  []RightLeg []Left Leg  [] Four layer Compression Wrap      []RightLeg []Left Leg   [x]  Milikan two layer calamine LITE               [x] Right Leg   [x] Left Leg       [x] Elevate leg(s) above the level of the heart when sitting. [x] Avoid prolonged standing in one place. Multilayer Compression Wrap   Below the Knee    Instructed patient/caregiver not to remove dressing and to keep it clean and dry. Instructed patient/caregiver on complications to report to provider, such as pain, numbness in toes, heavy drainage, and slippage of dressing. Instructed patient on purpose of compression dressing and on activity and exercise recommendations. Dietary:  [x] Diet as tolerated    [x] Increase Protein: examples (Meat, cheese, eggs, greek yogurt, fish, nuts)   [] Catrachito Therapeutic Nutrition Powder  [] Other:  [] Dial a Dietician : Call [employer] at [phone] enter code [phone]) when prompted. M-F 9am-5pm EST. Return Appointment:  [] Nurse Visit at wound center in *** days   [x] Return Appointment: With Dr. Marquita White  in 1 week(s)  [] Ordered tests:     Electronically signed on 5/5/2022 at 8:07 AM     PLEASE NOTE: IF YOU ARE UNABLE TO [address], CONTINUE TO USE THE SUPPLIES YOU HAVE AVAILABLE UNTIL YOU ARE ABLE TO [facility]. IT IS MOST IMPORTANT TO KEEP THE WOUND COVERED AT ALL TIMES.      Physician Signature:_______________________  Dr. Marquita White

## 2022-05-19 ENCOUNTER — HOSPITAL ENCOUNTER (OUTPATIENT)
Dept: WOUND CARE | Age: 67
Discharge: HOME | End: 2022-05-19
Payer: MEDICAID

## 2022-05-19 VITALS
DIASTOLIC BLOOD PRESSURE: 82 MMHG | SYSTOLIC BLOOD PRESSURE: 150 MMHG | RESPIRATION RATE: 20 BRPM | TEMPERATURE: 97.3 F | HEART RATE: 92 BPM

## 2022-05-19 DIAGNOSIS — L97.222 CALF ULCER, LEFT, WITH FAT LAYER EXPOSED (HCC): Primary | ICD-10-CM

## 2022-05-19 DIAGNOSIS — L97.212 CHRONIC ULCER OF RIGHT CALF WITH FAT LAYER EXPOSED (HCC): ICD-10-CM

## 2022-05-19 PROCEDURE — 74011000250 HC RX REV CODE- 250: Performed by: FAMILY MEDICINE

## 2022-05-19 PROCEDURE — 11042 DBRDMT SUBQ TIS 1ST 20SQCM/<: CPT

## 2022-05-19 RX ORDER — MUPIROCIN 20 MG/G
OINTMENT TOPICAL ONCE
Status: CANCELLED | OUTPATIENT
Start: 2022-05-19 | End: 2022-05-19

## 2022-05-19 RX ORDER — LIDOCAINE HYDROCHLORIDE 20 MG/ML
JELLY TOPICAL ONCE
Status: CANCELLED | OUTPATIENT
Start: 2022-05-19 | End: 2022-05-19

## 2022-05-19 RX ORDER — LIDOCAINE 50 MG/G
OINTMENT TOPICAL ONCE
Status: CANCELLED | OUTPATIENT
Start: 2022-05-19 | End: 2022-05-19

## 2022-05-19 RX ORDER — CLOBETASOL PROPIONATE 0.5 MG/G
OINTMENT TOPICAL ONCE
Status: CANCELLED | OUTPATIENT
Start: 2022-05-19 | End: 2022-05-19

## 2022-05-19 RX ORDER — LIDOCAINE HYDROCHLORIDE 40 MG/ML
SOLUTION TOPICAL ONCE
Status: CANCELLED | OUTPATIENT
Start: 2022-05-19 | End: 2022-05-19

## 2022-05-19 RX ORDER — LIDOCAINE 40 MG/G
CREAM TOPICAL ONCE
Status: CANCELLED | OUTPATIENT
Start: 2022-05-19 | End: 2022-05-19

## 2022-05-19 RX ORDER — BACITRACIN 500 [USP'U]/G
OINTMENT TOPICAL ONCE
Status: CANCELLED | OUTPATIENT
Start: 2022-05-19 | End: 2022-05-19

## 2022-05-19 RX ORDER — TRIAMCINOLONE ACETONIDE 1 MG/G
OINTMENT TOPICAL ONCE
Status: CANCELLED | OUTPATIENT
Start: 2022-05-19 | End: 2022-05-19

## 2022-05-19 RX ORDER — GENTAMICIN SULFATE 1 MG/G
OINTMENT TOPICAL ONCE
Status: CANCELLED | OUTPATIENT
Start: 2022-05-19 | End: 2022-05-19

## 2022-05-19 RX ADMIN — Medication: at 11:04

## 2022-05-19 NOTE — WOUND CARE
05/19/22 1157   Right Leg Edema Point of Measurement   Compression Therapy 2 layer compression wrap   Left Leg Edema Point of Measurement   Compression Therapy Tubular elastic support bandage   Wound Leg lower Right circumferential, #1   Date First Assessed/Time First Assessed: 05/05/22 1336   Present on Hospital Admission: Yes  Location: Leg lower  Wound Location Orientation: Right  Wound Description: circumferential, #1   Dressing Status New dressing applied   Dressing/Treatment Betadine swabs/Povidone Iodine;Gauze dressing/dressing sponge;ABD pad   Discharge Condition: Stable     Pain: 0    Ambulatory Status: Walking    Discharge Destination: Home     Transportation: Car    Accompanied by: Self     Discharge instructions reviewed with Patient and copy or written instructions have been provided. All questions/concerns have been addressed at this time. Multilayer Compression Wrap   (Not Unna) Below the Knee    NAME:  Karl Brand OF BIRTH:  1955  MEDICAL RECORD NUMBER:  [id number]  DATE:  5/19/2022    Removed old Multilayer wrap if indicated and wash leg with mild soap/water. Applied moisturizing agent to dry skin as needed. Applied primary and secondary dressing as ordered. Applied multilayered dressing below the knee to right lower leg. Instructed patient/caregiver not to remove dressing and to keep it clean and dry. Instructed patient/caregiver on complications to report to provider, such as pain, numbness in toes, heavy drainage, and slippage of dressing. Instructed patient on purpose of compression dressing and on activity and exercise recommendations.     Response to treatment: Well tolerated by patient       Electronically signed by Estefani Estrada on 5/19/2022 at 12:00 PM

## 2022-05-19 NOTE — WOUND CARE
05/19/22 1055   Anesthetic   Anesthetic 4% Lidocaine Liquid Topical   Right Leg Edema Point of Measurement   Leg circumference 43.5 cm   Ankle circumference 26.5 cm   Left Leg Edema Point of Measurement   Leg circumference 47 cm   Ankle circumference 28 cm   Wound Foot Left;Plantar #3   Date First Assessed/Time First Assessed: 05/12/22 1020   Present on Hospital Admission: Yes  Primary Wound Type: Blister/bullae  Location: Foot  Wound Location Orientation: Left;Plantar  Wound Description: #3   Wound Image    Wound Length (cm) 0 cm   Wound Width (cm) 0 cm   Wound Depth (cm) 0 cm   Wound Surface Area (cm^2) 0 cm^2   Change in Wound Size % 100   Wound Volume (cm^3) 0 cm^3   Wound Healing % 100   Drainage Amount None   Wound Odor None   Wound Leg lower Right circumferential, #1   Date First Assessed/Time First Assessed: 05/05/22 1336   Present on Hospital Admission: Yes  Location: Leg lower  Wound Location Orientation: Right  Wound Description: circumferential, #1   Wound Image     Wound Etiology Venous   Cleansed Soap and water   Wound Length (cm) 12.5 cm   Wound Width (cm) 25 cm   Wound Depth (cm) 0.2 cm   Wound Surface Area (cm^2) 312.5 cm^2   Change in Wound Size % 23.03   Wound Volume (cm^3) 62.5 cm^3   Wound Healing % 23   Wound Assessment Slough;Pink/red;Epithelialization   Drainage Amount Large   Drainage Description Serous   Wound Odor None   Buffy-Wound/Incision Assessment Maceration   Edges Defined edges     Visit Vitals  BP (!) 150/82 (BP 1 Location: Right upper arm, BP Patient Position: Sitting)   Pulse 92   Temp 97.3 °F (36.3 °C)   Resp 20

## 2022-05-19 NOTE — DISCHARGE INSTRUCTIONS
Discharge Instructions for  [facility]  [address] [place], [address] [facility]  Telephone: [phone]    [address] [place] [address] Information: Should you experience any significant changes in your wound(s) or have questions about your wound care, please contact the [facility] at [address] [place] [address] 8:00 am - 4:30. If you need help with your wound outside these hours and cannot wait until we are again available, contact your PCP or go to the hospital emergency room. NAME:  Arielle Varghese  YOB: 1955  DATE:  5/12/2022    : Sha Max     [x] will get HH to come out to house    Wound Cleansing:   Do not scrub or use excessive force. Cleanse wound prior to applying a clean dressing with:   [x] Keep Wound Dry in Shower - may purchase a cast cover at local pharmacy or sponge bath    [] Cleanse wound with Mild Soap & Water    [] May Shower at Discharge: remove dressing 1st, redress wound right after with a new dressing  [] Do not shower  [x] cleanse with baby shampoo lather leave 2-3 then rinse with water on dressing changes     Topical Treatments:  Do not apply lotions, creams, or ointments to wound bed unless directed. [x] Apply moisturizing lotion A&D ointment to skin surrounding the wound prior to dressing change.   [] Other:     Dressings:           Wound Location Right  lower legs     Apply Primary Dressing:      [x] adaptic, double layer alginate, no silver, betadine wet to dry to bottom of feet    Cover and Secure with:  [] Gauze [x] ABD  [] exudry     [] Ajay [] Kerlix [] Mepilex Border  [] Ace Wrap [] Roll Tape    [x] Other: 2 layer Milikan calamine LITE      Dressings:           Wound Location Left lower leg     Apply Primary Dressing:          [x] Other: A&D ointment to lower leg, betadine wet to dry to left plantar wound, gauze, tape, double tubi     Change dressing:    [] Every Other Day   [x] Three times per week  [] Once a week   [] Do Not Change Dressing     [] Other:     Edema Control: Every morning immediately when getting up should be applied to affected leg(s) from mid foot to knee making sure to cover the heel. Remove every night before going to bed if desired. Apply: [] Compression Stocking      []Right Leg           []Left Leg   [x] Tubigrip {tubigrip:44485}   [] Right Leg Single Layer  [] Right Leg Double Layer                              [] Left Leg Single Layer [x] Left Leg Double Layer      Compression: Do not get leg(s) with wrap wet. If wraps become too tight call the center or completely remove the wrap. Apply: [] Three Layer Compression Wrap  []RightLeg []Left Leg  [] Four layer Compression Wrap      []RightLeg []Left Leg   [x]  Milikan two layer calamine LITE               [x] Right Leg   [] Left Leg       [x] Elevate leg(s) above the level of the heart when sitting. [x] Avoid prolonged standing in one place. Multilayer Compression Wrap   Below the Knee    Instructed patient/caregiver not to remove dressing and to keep it clean and dry. Instructed patient/caregiver on complications to report to provider, such as pain, numbness in toes, heavy drainage, and slippage of dressing. Instructed patient on purpose of compression dressing and on activity and exercise recommendations. Dietary:  [x] Diet as tolerated    [x] Increase Protein: examples (Meat, cheese, eggs, greek yogurt, fish, nuts)   [] Catrachito Therapeutic Nutrition Powder  [] Other:  [] Dial a Dietician : Call [employer] at [phone] enter code ([phone]) when prompted. M-F 9am-5pm EST. Return Appointment:  [] Nurse Visit at wound center in *** days   [x] Return Appointment: With Dr. Micki Wise  in 1 week(s)  [] Ordered tests:     Electronically signed on 5/12/2022     PLEASE NOTE: IF YOU ARE UNABLE TO OBTAIN WOUND SUPPLIES, CONTINUE TO USE THE SUPPLIES YOU HAVE AVAILABLE UNTIL YOU ARE ABLE TO [address].  IT IS MOST IMPORTANT TO KEEP THE WOUND COVERED AT ALL TIMES.      Physician Signature:_______________________  Dr. Idalmis Aparicio

## 2022-05-19 NOTE — PROGRESS NOTES
Wound Center  Progress Note / Procedure Note    Subjective:     Chief Complaint:  Marcela Garcias is a 73 y.o.  female  with leg wounds of >few months duration. HPI:     Legs weeping and swelling  Saw Dr Katherine Andrea  Had studies  Didn't wish to come here so Dr Kandice العلي had been applying unnas in his office  Referred hre as wound on right getting worse    History/Chart/Medications reviewed    Since last visit:  Able to better elevate legs      Wound caused by: swelling  Current wound care:See flowsheet  Offloading wound: yes  Appetite: good  Wound associated pain: See flowsheet  Diabetic: yes  Smoker: no  ROS: no N/V/D, no T/chills; no local rash, no chest pain or shortness of breath, no headache or dizzyness      Objective:     Physical Exam:   See flowsheet / nursing notes for vitals  Visit Vitals  BP (!) 150/82 (BP 1 Location: Right upper arm, BP Patient Position: Sitting)   Pulse 92   Temp 97.3 °F (36.3 °C)   Resp 20     General: NAD. Hygiene good  Psych: cooperative. No anxiety or depression. Normal mood and affect. Neuro: alert and oriented to person/place/situation. Otherwise nonfocal.  Derm: Normal  turgor for age, dry skin  HEENT: Normocephalic, atraumatic. EOMI. Conjunctiva clear. No scleral icterus. Neck: Normal range of motion. No masses. Chest: Respirations nonlabored  Lower extremities: color normal; temperature normal. Hair growth is not present. Calves are supple, nontender, approximately equally sized in comparison. Capillary refill <3 sec  Focussed Lower Extremity Exam:  Vascular exam:  Right lower extremity: moderate  edema, foot warm,   DP pulse : 1+  PT pulse: 0  Nails dystrophic   Left lower extremity: moderate  edema, foot warm,   DP pulse : 1+  PT pulse: 0   Nails dystrophic    Ulcer Description:   See Flowsheet         Data Review:              Assessment/Plan     73 y.o. female with diabetes, chronic leg edema    -R/ leg ulcer.   Full thickness- small area, primarily partial thickness  Clustered  NEWER areas  Slough/granular  Necessitates debridement  for wound healing and to prevent/heal infection  See below    -L/ leg ulcer. Healed  Apply A& D, tubi x 2          - leg edema      Venous insuff  Get recrods from Dr [facility] office    Dm2  Poor control per patient      Following discussed with patient   Needs :  Serial debridement- debrided today- see note below    Good local wound care  R/ leg  Dressing: D/C Adaptic, aquacel; start betadine wet to dry  Frequency : 2x/week        -Edema management  Remove dressing prior to showering  Do not get dressing wet    Edema management:  Elevate leg(s) throughout the day starting in the morning  Compression: unna lite 2 layer  Avoid prolonged standing      -Good Diabetic control    -Good offloading    Patient/ understood and agrees with plan. Questions answered. Serial visits and serial debridements - continue    Follow up with me in 1 week        Procedure:     Ulcer assessment: Due to presence of necrotic tissue within the wound bed, ulcer requires debridement. Procedure: Debridement:   The indication for debridement was reviewed with patient. Risks of procedure (bleeding, infection, pain) were discussed with patient and consent signed on first visit. Questions were answered      Subcutaneous debridement Right leg ulcer  Indication: to remove necrotic tissue/ vitalized and devitalized tissue/ infected tissue through skin and subcutaneous  layer of wound bed  Time out done  Consent in chart   Anesthesia: Topical  lidocaine   Instrument:  curette  Residual Necrosis: Present and scored   Bleeding: <1ml   Hemostasis: Pressure   Patient tolerated procedure well   Procedural Pain: 0  Post - procedural pain: 0    Post debridement measurements: see below  Surface area debrided: <20 sq.  Cm ( only certain areas debrided)    Wound Leg lower Right circumferential, #1 (Active)   Wound Image    05/19/22 1055   Wound Etiology Venous 05/19/22 1055   Dressing Status New dressing applied 22 1157   Cleansed Soap and water 22 1055   Dressing/Treatment Betadine swabs/Povidone Iodine;Gauze dressing/dressing sponge;ABD pad 22 1157   Offloading for Diabetic Foot Ulcers Offloading not required 22 1019   Wound Length (cm) 12.5 cm 22 1055   Wound Width (cm) 25 cm 22 1055   Wound Depth (cm) 0.2 cm 22 1055   Wound Surface Area (cm^2) 312.5 cm^2 22 1055   Change in Wound Size % 23.03 22 1055   Wound Volume (cm^3) 62.5 cm^3 22 1055   Wound Healing % 23 22 1055   Post-Procedure Length (cm) 12.5 cm 22 1145   Post-Procedure Width (cm) 25 cm 22 1145   Post-Procedure Depth (cm) 0.4 cm 22 1145   Post-Procedure Surface Area (cm^2) 312.5 cm^2 22 1145   Post-Procedure Volume (cm^3) 125 cm^3 22 1145   Wound Assessment Slough;Pink/red;Epithelialization 22 1055   Drainage Amount Large 22 1055   Drainage Description Serous 22 1055   Wound Odor None 22 1055   Buffy-Wound/Incision Assessment Maceration 22 1055   Edges Defined edges 22 1055   Number of days: 15          -------    Past Medical History:   Diagnosis Date    Allergies     Asthma     Cardiac arrhythmia     Diabetes ([facility] [address]     Hx of long term use of blood thinners     Hypertension     Thyroid disease       Past Surgical History:   Procedure Laterality Date    HX  SECTION      HX GYN       Family History   Problem Relation Age of Onset    Hypertension Other     Diabetes Other     Heart Disease Other       Social History     Tobacco Use    Smoking status: Never Smoker    Smokeless tobacco: Never Used   Substance Use Topics    Alcohol use: No       Prior to Admission medications    Medication Sig Start Date End Date Taking? Authorizing Provider   gabapentin (NEURONTIN) 300 mg capsule Take 300 mg by mouth nightly.  Take 1 capsule by mouth every day at bedtime, and 1 capsule during the day as needed   Yes Provider, Historical   amLODIPine-benazepril (LOTREL) 5-10 mg per capsule Take 1 Capsule by mouth daily. Yes Provider, Historical   amLODIPine (NORVASC) 5 mg tablet Take 5 mg by mouth daily. Yes Provider, Historical   oxybutynin (DITROPAN) 5 mg tablet Take 5 mg by mouth two (2) times a day. Yes Provider, Historical   fluocinoNIDE (LIDEX) 0.05 % ointment Apply  to affected area two (2) times a day. 1/22/21  Yes Charity Thorne MD   loratadine (CLARITIN) 10 mg tablet TAKE 1 TABLET BY MOUTH EVERY DAY 12/7/20  Yes Charity Thorne MD   levothyroxine (SYNTHROID) 25 mcg tablet TAKE 1 TABLET BY MOUTH EVERY DAY  Patient taking differently: 50 mcg. TAKE 1 TABLET BY MOUTH EVERY DAY 12/7/20  Yes Charity Thorne MD   Jardiance 25 mg tablet TAKE 1 TABLET BY MOUTH EVERY DAY 12/7/20  Yes Charity Thorne MD   busPIRone (BUSPAR) 5 mg tablet Take 1 Tab by mouth three (3) times daily. Indications: repeated episodes of anxiety 12/7/20  Yes Charity Thorne MD   DULoxetine (CYMBALTA) 20 mg capsule Take 1 Cap by mouth two (2) times a day. Indications: anxiousness associated with depression  Patient taking differently: Take 30 mg by mouth two (2) times a day. Indications: anxiousness associated with depression 12/7/20  Yes Charity Thorne MD   SITagliptin-metFORMIN (Janumet XR) 50-1,000 mg TM24 TAKE 1 TABLET BY MOUTH TWICE A DAY 12/7/20  Yes Charity Thorne MD   warfarin (COUMADIN) 2 mg tablet Take 6 mg by mouth daily. 8/14/20  Yes Provider, Historical   sotaloL (BETAPACE) 80 mg tablet Take 80 mg by mouth two (2) times a day. 8/3/20  Yes Provider, Historical   cyclobenzaprine (FLEXERIL) 10 mg tablet Take 10 mg by mouth three (3) times daily. 6/11/20  Yes Provider, Historical   clotrimazole (LOTRIMIN) 1 % topical cream Apply  to affected area two (2) times a day.  On FEET 9/1/20  Yes Charity Thorne MD   betamethasone dipropionate (DIPROSONE) 0.05 % topical cream Apply  to affected area two (2) times daily as needed for Skin Irritation. Of face 9/1/20  Yes Guillaume Lipscomb MD   albuterol (PROVENTIL HFA, VENTOLIN HFA, PROAIR HFA) 90 mcg/actuation inhaler Take 2 Puffs by inhalation every four (4) hours as needed for Wheezing.    Yes Other, MD Nahid     Allergies   Allergen Reactions    Cephalexin Unknown (comments)    Sulfa (Sulfonamide Antibiotics) Unknown (comments)    Zofran [Ondansetron Hcl] Unknown (comments)          Signed By: Leopoldo Baptiste MD     May 19, 2022

## 2022-05-26 ENCOUNTER — HOSPITAL ENCOUNTER (OUTPATIENT)
Dept: WOUND CARE | Age: 67
Discharge: HOME | End: 2022-05-26
Payer: MEDICAID

## 2022-05-26 VITALS — TEMPERATURE: 97.4 F | DIASTOLIC BLOOD PRESSURE: 92 MMHG | SYSTOLIC BLOOD PRESSURE: 179 MMHG | HEART RATE: 83 BPM

## 2022-05-26 DIAGNOSIS — L97.222 CALF ULCER, LEFT, WITH FAT LAYER EXPOSED (HCC): ICD-10-CM

## 2022-05-26 DIAGNOSIS — L97.212 CHRONIC ULCER OF RIGHT CALF WITH FAT LAYER EXPOSED (HCC): ICD-10-CM

## 2022-05-26 PROCEDURE — 11042 DBRDMT SUBQ TIS 1ST 20SQCM/<: CPT

## 2022-05-26 PROCEDURE — 74011000250 HC RX REV CODE- 250: Performed by: FAMILY MEDICINE

## 2022-05-26 RX ORDER — BACITRACIN 500 [USP'U]/G
OINTMENT TOPICAL ONCE
Status: CANCELLED | OUTPATIENT
Start: 2022-05-26 | End: 2022-05-26

## 2022-05-26 RX ORDER — CLOBETASOL PROPIONATE 0.5 MG/G
OINTMENT TOPICAL ONCE
Status: CANCELLED | OUTPATIENT
Start: 2022-05-26 | End: 2022-05-26

## 2022-05-26 RX ORDER — LIDOCAINE HYDROCHLORIDE 40 MG/ML
SOLUTION TOPICAL ONCE
Status: CANCELLED | OUTPATIENT
Start: 2022-05-26 | End: 2022-05-26

## 2022-05-26 RX ORDER — LIDOCAINE 50 MG/G
OINTMENT TOPICAL ONCE
Status: CANCELLED | OUTPATIENT
Start: 2022-05-26 | End: 2022-05-26

## 2022-05-26 RX ORDER — LIDOCAINE HYDROCHLORIDE 20 MG/ML
JELLY TOPICAL ONCE
Status: CANCELLED | OUTPATIENT
Start: 2022-05-26 | End: 2022-05-26

## 2022-05-26 RX ORDER — GENTAMICIN SULFATE 1 MG/G
OINTMENT TOPICAL ONCE
Status: CANCELLED | OUTPATIENT
Start: 2022-05-26 | End: 2022-05-26

## 2022-05-26 RX ORDER — MUPIROCIN 20 MG/G
OINTMENT TOPICAL ONCE
Status: CANCELLED | OUTPATIENT
Start: 2022-05-26 | End: 2022-05-26

## 2022-05-26 RX ORDER — TRIAMCINOLONE ACETONIDE 1 MG/G
OINTMENT TOPICAL ONCE
Status: CANCELLED | OUTPATIENT
Start: 2022-05-26 | End: 2022-05-26

## 2022-05-26 RX ORDER — LIDOCAINE 40 MG/G
CREAM TOPICAL ONCE
Status: CANCELLED | OUTPATIENT
Start: 2022-05-26 | End: 2022-05-26

## 2022-05-26 RX ADMIN — Medication: at 13:35

## 2022-05-26 NOTE — WOUND CARE
05/26/22 1410   Right Leg Edema Point of Measurement   Compression Therapy 2 layer compression wrap   Left Leg Edema Point of Measurement   Compression Therapy Tubular elastic support bandage   Wound Leg lower Right circumferential, #1   Date First Assessed/Time First Assessed: 05/05/22 1336   Present on Hospital Admission: Yes  Location: Leg lower  Wound Location Orientation: Right  Wound Description: circumferential, #1   Dressing/Treatment Betadine swabs/Povidone Iodine;ABD pad;Gauze dressing/dressing sponge   Discharge Condition: Stable     Pain: 0    Ambulatory Status: Walking    Discharge Destination: Home     Transportation: Car    Accompanied by: Self     Discharge instructions reviewed with Patient and copy or written instructions have been provided. All questions/concerns have been addressed at this time. Multilayer Compression Wrap   (Not Unna) Below the Knee    NAME:  Emani Oliveira OF BIRTH:  1955  MEDICAL RECORD NUMBER:  [id number]  DATE:  5/26/2022    Removed old Multilayer wrap if indicated and wash leg with mild soap/water. Applied moisturizing agent to dry skin as needed. Applied primary and secondary dressing as ordered. Applied multilayered dressing below the knee to right lower leg. Instructed patient/caregiver not to remove dressing and to keep it clean and dry. Instructed patient/caregiver on complications to report to provider, such as pain, numbness in toes, heavy drainage, and slippage of dressing. Instructed patient on purpose of compression dressing and on activity and exercise recommendations.     Response to treatment: Well tolerated by patient       Electronically signed by Amy Borden RN on 5/26/2022 at 2:11 PM

## 2022-05-26 NOTE — DISCHARGE INSTRUCTIONS
Discharge Instructions for  [facility]  [address] [place], [address] [facility]  Telephone: [phone] [address] [facility] Information: Should you experience any significant changes in your wound(s) or have questions about your wound care, please contact the [facility] at [address] [place] [address] Street 8:00 am - 4:30. If you need help with your wound outside these hours and cannot wait until we are again available, contact your PCP or go to the hospital emergency room. NAME:  Daniele Daniel  YOB: 1955  DATE:  5/19/2022    : Linda Vargas     [x] Home [facility]: [facility]     Wound Cleansing:   Do not scrub or use excessive force. Cleanse wound prior to applying a clean dressing with:   [x] Keep Wound Dry in Shower - may purchase a cast cover at local pharmacy or sponge bath    [] Cleanse wound with Mild Soap & Water    [] May Shower at Discharge: remove dressing 1st, redress wound right after with a new dressing  [] Do not shower  [x] cleanse with baby shampoo lather leave 2-3 then rinse with water on dressing changes     Topical Treatments:  Do not apply lotions, creams, or ointments to wound bed unless directed. [x] Apply moisturizing lotion A&D ointment to skin surrounding the wound prior to dressing change.   [] Other:     Dressings:           Wound Location Right  lower legs     Apply Primary Dressing:      [x] betadine wet to dry, 2 layer calamine lite wrap    Cover and Secure with:  [] Gauze [x] ABD  [] exudry     [] Ajay [] Kerlix [] Mepilex Border  [] Ace Wrap [] Roll Tape    [x] Other: 2 layer Milikan calamine LITE    Change dressing:    [x] Twice per week      Dressings:           Wound Location Left lower leg     Apply Primary Dressing:          [x] Other: A&D ointment to lower leg, double tubi        Edema Control: Every morning immediately when getting up should be applied to affected leg(s) from mid foot to knee making sure to cover the heel. Remove every night before going to bed if desired. Apply: [] Compression Stocking      []Right Leg           []Left Leg   [x] Tubigrip {tubigrip:65844}   [] Right Leg Single Layer  [] Right Leg Double Layer                              [] Left Leg Single Layer [x] Left Leg Double Layer      Compression: Do not get leg(s) with wrap wet. If wraps become too tight call the center or completely remove the wrap. Apply: [] Three Layer Compression Wrap  []RightLeg []Left Leg  [] Four layer Compression Wrap      []RightLeg []Left Leg   [x]  Milikan two layer calamine LITE               [x] Right Leg   [] Left Leg       [x] Elevate leg(s) above the level of the heart when sitting. [x] Avoid prolonged standing in one place. Multilayer Compression Wrap   Below the Knee    Instructed patient/caregiver not to remove dressing and to keep it clean and dry. Instructed patient/caregiver on complications to report to provider, such as pain, numbness in toes, heavy drainage, and slippage of dressing. Instructed patient on purpose of compression dressing and on activity and exercise recommendations. Dietary:  [x] Diet as tolerated    [x] Increase Protein: examples (Meat, cheese, eggs, greek yogurt, fish, nuts)   [] Catrachito Therapeutic Nutrition Powder  [] Other:  [] Dial a Dietician : Call [employer] at [phone] enter code ([phone]) when prompted. M-F 9am-5pm EST. Return Appointment:  [] Nurse Visit at wound center in *** days   [x] Return Appointment: With Dr. Wei Bradford  in 1 week(s)  [] Ordered tests:     Electronically signed on 5/19/2022     PLEASE NOTE: IF YOU ARE UNABLE TO [address], CONTINUE TO USE THE SUPPLIES YOU HAVE AVAILABLE UNTIL YOU ARE ABLE TO [facility]. IT IS MOST IMPORTANT TO KEEP THE WOUND COVERED AT ALL TIMES.      Physician Signature:_______________________  Dr. Wei Bradford

## 2022-05-26 NOTE — PROGRESS NOTES
Wound Center  Progress Note / Procedure Note    Subjective:     Chief Complaint:  Sylwia Milian is a 73 y.o.  female  with leg wounds of >few months duration. HPI:     Legs weeping and swelling  Saw Dr Nati Christensen  Had studies  Didn't wish to come here so Dr Jc Maradiaga had been applying unnas in his office  Referred hre as wound on right getting worse    History/Chart/Medications reviewed    Since last visit:  No new issues      Wound caused by: swelling  Current wound care:See flowsheet  Offloading wound: yes  Appetite: good  Wound associated pain: See flowsheet  Diabetic: yes  Smoker: no  ROS: no N/V/D, no T/chills; no local rash, no chest pain or shortness of breath, no headache or dizzyness      Objective:     Physical Exam:   See flowsheet / nursing notes for vitals  Visit Vitals  BP (!) 179/92 (BP 1 Location: Left upper arm, BP Patient Position: Sitting)   Pulse 83   Temp 97.4 °F (36.3 °C)     General: NAD. Hygiene good  Psych: cooperative. No anxiety or depression. Normal mood and affect. Neuro: alert and oriented to person/place/situation. Otherwise nonfocal.  Derm: Normal  turgor for age, dry skin  HEENT: Normocephalic, atraumatic. EOMI. Conjunctiva clear. No scleral icterus. Neck: Normal range of motion. No masses. Chest: Respirations nonlabored  Lower extremities: color normal; temperature normal. Hair growth is not present. Calves are supple, nontender, approximately equally sized in comparison. Capillary refill <3 sec  Focussed Lower Extremity Exam:  Vascular exam:  Right lower extremity: moderate  edema, foot warm,   DP pulse : 1+  PT pulse: 0  Nails dystrophic   Left lower extremity: moderate  edema, foot warm,   DP pulse : 1+  PT pulse: 0   Nails dystrophic    Ulcer Description:   See Flowsheet         Data Review:              Assessment/Plan     73 y.o. female with diabetes, chronic leg edema    -R/ leg ulcer.   Full thickness- small area, primarily partial thickness  Clustered  smaller  Slough/granular  Necessitates debridement  for wound healing and to prevent/heal infection  See below    -L/ leg ulcer. Healed  Apply A& D, tubi x 2          - leg edema      Venous insuff  Get recrods from Dr Therese Benz office    Dm2  Poor control per patient      Following discussed with patient   Needs :  Serial debridement- debrided today- see note below    Good local wound care  R/ leg  Dressing:  betadine wet to dry  Frequency : 2x/week        -Edema management  Remove dressing prior to showering  Do not get dressing wet    Edema management:  Elevate leg(s) throughout the day starting in the morning  Compression: unna lite 2 layer  Avoid prolonged standing      -Good Diabetic control    -Good offloading    Patient/ understood and agrees with plan. Questions answered. Serial visits and serial debridements - continue    Follow up with me in 1 week        Procedure:     Ulcer assessment: Due to presence of necrotic tissue within the wound bed, ulcer requires debridement. Procedure: Debridement:   The indication for debridement was reviewed with patient. Risks of procedure (bleeding, infection, pain) were discussed with patient and consent signed on first visit. Questions were answered      Subcutaneous debridement Right leg ulcer  Indication: to remove necrotic tissue/ vitalized and devitalized tissue/ infected tissue through skin and subcutaneous  layer of wound bed  Time out done  Consent in chart   Anesthesia: Topical  lidocaine   Instrument:  curette  Residual Necrosis: Present and scored   Bleeding: <1ml   Hemostasis: Pressure   Patient tolerated procedure well   Procedural Pain: 0  Post - procedural pain: 0    Post debridement measurements: see below  Surface area debrided: <20 sq.  Cm ( only certain areas debrided)    Wound Leg lower Right circumferential, #1 (Active)   Wound Image     05/26/22 1331   Wound Etiology Venous 05/19/22 1055   Dressing Status New dressing applied 22 1157   Cleansed Soap and water 22 1331   Dressing/Treatment Betadine swabs/Povidone Iodine;ABD pad;Gauze dressing/dressing sponge 22 1410   Offloading for Diabetic Foot Ulcers Offloading not required 22 1019   Wound Length (cm) 7 cm 22 1331   Wound Width (cm) 22.5 cm 22 1331   Wound Depth (cm) 0.2 cm 22 1331   Wound Surface Area (cm^2) 157.5 cm^2 22 1331   Change in Wound Size % 61.21 22 1331   Wound Volume (cm^3) 31.5 cm^3 22 1331   Wound Healing % 61 22 1331   Post-Procedure Length (cm) 7 cm 22 1400   Post-Procedure Width (cm) 22.5 cm 22 1400   Post-Procedure Depth (cm) 0.3 cm 22 1400   Post-Procedure Surface Area (cm^2) 157.5 cm^2 22 1400   Post-Procedure Volume (cm^3) 47.25 cm^3 22 1400   Wound Assessment Slough;Pink/red;Epithelialization 22 1331   Drainage Amount Moderate 22 1331   Drainage Description Serous 22 1331   Wound Odor None 22 1331   Buffy-Wound/Incision Assessment Blanchable erythema 22 1331   Edges Flat/open edges 22 1331   Number of days: 21          -------    Past Medical History:   Diagnosis Date    Allergies     Asthma     Cardiac arrhythmia     Diabetes ([facility] [address]     Hx of long term use of blood thinners     Hypertension     Thyroid disease       Past Surgical History:   Procedure Laterality Date    HX  SECTION      HX GYN       Family History   Problem Relation Age of Onset    Hypertension Other     Diabetes Other     Heart Disease Other       Social History     Tobacco Use    Smoking status: Never Smoker    Smokeless tobacco: Never Used   Substance Use Topics    Alcohol use: No       Prior to Admission medications    Medication Sig Start Date End Date Taking? Authorizing Provider   gabapentin (NEURONTIN) 300 mg capsule Take 300 mg by mouth nightly.  Take 1 capsule by mouth every day at bedtime, and 1 capsule during the day as needed Provider, Historical   amLODIPine-benazepril (LOTREL) 5-10 mg per capsule Take 1 Capsule by mouth daily. Provider, Historical   amLODIPine (NORVASC) 5 mg tablet Take 5 mg by mouth daily. Provider, Historical   oxybutynin (DITROPAN) 5 mg tablet Take 5 mg by mouth two (2) times a day. Provider, Historical   fluocinoNIDE (LIDEX) 0.05 % ointment Apply  to affected area two (2) times a day. 1/22/21   Kiah Camilo MD   loratadine (CLARITIN) 10 mg tablet TAKE 1 TABLET BY MOUTH EVERY DAY 12/7/20   Kiah Camilo MD   levothyroxine (SYNTHROID) 25 mcg tablet TAKE 1 TABLET BY MOUTH EVERY DAY  Patient taking differently: 50 mcg. TAKE 1 TABLET BY MOUTH EVERY DAY 12/7/20   Kiah Camilo MD   Jardiance 25 mg tablet TAKE 1 TABLET BY MOUTH EVERY DAY 12/7/20   Kiah Camilo MD   busPIRone (BUSPAR) 5 mg tablet Take 1 Tab by mouth three (3) times daily. Indications: repeated episodes of anxiety 12/7/20   Kiah Camilo MD   DULoxetine (CYMBALTA) 20 mg capsule Take 1 Cap by mouth two (2) times a day. Indications: anxiousness associated with depression  Patient taking differently: Take 30 mg by mouth two (2) times a day. Indications: anxiousness associated with depression 12/7/20   Kiah Camilo MD   SITagliptin-metFORMIN (Janumet XR) 50-1,000 mg TM24 TAKE 1 TABLET BY MOUTH TWICE A DAY 12/7/20   Kiah Camilo MD   warfarin (COUMADIN) 2 mg tablet Take 6 mg by mouth daily. 8/14/20   Provider, Historical   sotaloL (BETAPACE) 80 mg tablet Take 80 mg by mouth two (2) times a day. 8/3/20   Provider, Historical   cyclobenzaprine (FLEXERIL) 10 mg tablet Take 10 mg by mouth three (3) times daily. 6/11/20   Provider, Historical   clotrimazole (LOTRIMIN) 1 % topical cream Apply  to affected area two (2) times a day. On FEET 9/1/20   Kiah Camilo MD   betamethasone dipropionate (DIPROSONE) 0.05 % topical cream Apply  to affected area two (2) times daily as needed for Skin Irritation.  Of face 9/1/20 Ethan Almanza MD   albuterol (PROVENTIL HFA, VENTOLIN HFA, PROAIR HFA) 90 mcg/actuation inhaler Take 2 Puffs by inhalation every four (4) hours as needed for Wheezing.     Other, MD Nahid     Allergies   Allergen Reactions    Cephalexin Unknown (comments)    Sulfa (Sulfonamide Antibiotics) Unknown (comments)    Zofran [Ondansetron Hcl] Unknown (comments)          Signed By: Sam Sheth MD     May 26, 2022

## 2022-05-26 NOTE — WOUND CARE
05/26/22 1331   Anesthetic   Anesthetic 4% Lidocaine Liquid Topical   Right Leg Edema Point of Measurement   Leg circumference 32 cm   Ankle circumference 27 cm   RLE Peripheral Vascular    Capillary Refill Less than/equal to 3 seconds   Color Appropriate for race   Temperature Warm   Pedal Pulse Palpable   Wound Leg lower Right circumferential, #1   Date First Assessed/Time First Assessed: 05/05/22 1336   Present on Hospital Admission: Yes  Location: Leg lower  Wound Location Orientation: Right  Wound Description: circumferential, #1   Wound Image      Cleansed Soap and water   Wound Length (cm) 7 cm   Wound Width (cm) 22.5 cm   Wound Depth (cm) 0.2 cm   Wound Surface Area (cm^2) 157.5 cm^2   Change in Wound Size % 61.21   Wound Volume (cm^3) 31.5 cm^3   Wound Healing % 61   Wound Assessment Slough;Pink/red;Epithelialization   Drainage Amount Moderate   Drainage Description Serous   Wound Odor None   Buffy-Wound/Incision Assessment Blanchable erythema   Edges Flat/open edges   Pain 1   Pain Scale 1 Numeric (0 - 10)   Pain Intensity 1 0     Visit Vitals  BP (!) 179/92 (BP 1 Location: Left upper arm, BP Patient Position: Sitting)   Pulse 83   Temp 97.4 °F (36.3 °C)

## 2022-06-02 ENCOUNTER — HOSPITAL ENCOUNTER (OUTPATIENT)
Dept: WOUND CARE | Age: 67
Discharge: HOME | End: 2022-06-02
Payer: MEDICAID

## 2022-06-02 VITALS
HEART RATE: 83 BPM | SYSTOLIC BLOOD PRESSURE: 164 MMHG | TEMPERATURE: 97.5 F | DIASTOLIC BLOOD PRESSURE: 87 MMHG | RESPIRATION RATE: 19 BRPM

## 2022-06-02 DIAGNOSIS — L97.212 CHRONIC ULCER OF RIGHT CALF WITH FAT LAYER EXPOSED (HCC): ICD-10-CM

## 2022-06-02 DIAGNOSIS — L97.222 CALF ULCER, LEFT, WITH FAT LAYER EXPOSED (HCC): Primary | ICD-10-CM

## 2022-06-02 PROCEDURE — 74011000250 HC RX REV CODE- 250: Performed by: FAMILY MEDICINE

## 2022-06-02 PROCEDURE — 11042 DBRDMT SUBQ TIS 1ST 20SQCM/<: CPT

## 2022-06-02 RX ORDER — CLOBETASOL PROPIONATE 0.5 MG/G
OINTMENT TOPICAL ONCE
Status: CANCELLED | OUTPATIENT
Start: 2022-06-02 | End: 2022-06-02

## 2022-06-02 RX ORDER — LIDOCAINE 40 MG/G
CREAM TOPICAL ONCE
Status: CANCELLED | OUTPATIENT
Start: 2022-06-02 | End: 2022-06-02

## 2022-06-02 RX ORDER — BACITRACIN 500 [USP'U]/G
OINTMENT TOPICAL ONCE
Status: CANCELLED | OUTPATIENT
Start: 2022-06-02 | End: 2022-06-02

## 2022-06-02 RX ORDER — LIDOCAINE 50 MG/G
OINTMENT TOPICAL ONCE
Status: CANCELLED | OUTPATIENT
Start: 2022-06-02 | End: 2022-06-02

## 2022-06-02 RX ORDER — TRIAMCINOLONE ACETONIDE 1 MG/G
OINTMENT TOPICAL ONCE
Status: CANCELLED | OUTPATIENT
Start: 2022-06-02 | End: 2022-06-02

## 2022-06-02 RX ORDER — LIDOCAINE HYDROCHLORIDE 20 MG/ML
JELLY TOPICAL ONCE
Status: CANCELLED | OUTPATIENT
Start: 2022-06-02 | End: 2022-06-02

## 2022-06-02 RX ORDER — MUPIROCIN 20 MG/G
OINTMENT TOPICAL ONCE
Status: CANCELLED | OUTPATIENT
Start: 2022-06-02 | End: 2022-06-02

## 2022-06-02 RX ORDER — GENTAMICIN SULFATE 1 MG/G
OINTMENT TOPICAL ONCE
Status: CANCELLED | OUTPATIENT
Start: 2022-06-02 | End: 2022-06-02

## 2022-06-02 RX ORDER — LIDOCAINE HYDROCHLORIDE 40 MG/ML
SOLUTION TOPICAL ONCE
Status: CANCELLED | OUTPATIENT
Start: 2022-06-02 | End: 2022-06-02

## 2022-06-02 RX ADMIN — Medication: at 10:50

## 2022-06-02 NOTE — WOUND CARE
06/02/22 1126   Right Leg Edema Point of Measurement   Compression Therapy 2 layer compression wrap   Left Leg Edema Point of Measurement   Compression Therapy Compression stockings   Wound Leg lower Left;Lateral #2   Date First Assessed/Time First Assessed: 06/02/22 1120   Present on Hospital Admission: Yes  Location: Leg lower  Wound Location Orientation: Left;Lateral  Wound Description: #2   Dressing/Treatment Betadine swabs/Povidone Iodine  (2L wrap)   Multilayer Compression Wrap   (Not Unna) Below the Knee    NAME:  Warner Parker OF BIRTH:  1955  MEDICAL RECORD NUMBER:  [id number]  DATE:  6/2/2022    Removed old Multilayer wrap if indicated and wash leg with mild soap/water. Applied moisturizing agent to dry skin as needed. Applied primary and secondary dressing as ordered. Applied multilayered dressing below the knee to right lower leg. Instructed patient/caregiver not to remove dressing and to keep it clean and dry. Instructed patient/caregiver on complications to report to provider, such as pain, numbness in toes, heavy drainage, and slippage of dressing. Instructed patient on purpose of compression dressing and on activity and exercise recommendations. Response to treatment: Well tolerated by patient  Discharge Condition: Stable     Pain: 0    Ambulatory Status: Walking    Discharge Destination: Home     Transportation: Car    Accompanied by: Self     Discharge instructions reviewed with Patient and copy or written instructions have been provided. All questions/concerns have been addressed at this time.                    Electronically signed by Maninder Parisi LPN on 3/2/2324 at 98:91 AM

## 2022-06-02 NOTE — DISCHARGE INSTRUCTIONS
Discharge Instructions for  [facility]  [address] [place], [address] [facility] [address]  Telephone: [phone] [address] [facility] Information: Should you experience any significant changes in your wound(s) or have questions about your wound care, please contact the [facility] Main at [address] [place] [address] 8:00 am - 4:30. If you need help with your wound outside these hours and cannot wait until we are again available, contact your PCP or go to the hospital emergency room. NAME:  Eleanor Ryan  YOB: 1955  DATE:  5/26/2022    : Jammie     [x] [facility]: [facility]    Wound Cleansing:   Do not scrub or use excessive force. Cleanse wound prior to applying a clean dressing with:   [x] Keep Wound Dry in Shower - may purchase a cast cover at local pharmacy or sponge bath    [] Cleanse wound with Mild Soap & Water    [] May Shower at Discharge: remove dressing 1st, redress wound right after with a new dressing  [] Do not shower  [x] cleanse with baby shampoo lather leave 2-3 then rinse with water on dressing changes     Topical Treatments:  Do not apply lotions, creams, or ointments to wound bed unless directed. [x] Apply moisturizing lotion A&D ointment to skin surrounding the wound prior to dressing change.   [] Other:     Dressings:           Wound Location Right  lower legs     Apply Primary Dressing:      [x] betadine wet to dry, 2 layer calamine lite wrap    Cover and Secure with:  [] Gauze [x] ABD  [] exudry     [] Ajay [] Kerlix [] Mepilex Border  [] Ace Wrap [] Roll Tape    [x] Other: 2 layer Milikan calamine LITE    Change dressing:    [x] Twice per week      Dressings:           Wound Location Left lower leg     Apply Primary Dressing:          [x] Other: A&D ointment to lower leg, double tubi      Edema Control: Every morning immediately when getting up should be applied to affected leg(s) from mid foot to knee making sure to cover the heel. Remove every night before going to bed if desired. Apply: [] Compression Stocking      []Right Leg           []Left Leg   [x] Tubigrip {tubigrip:48472}   [] Right Leg Single Layer  [] Right Leg Double Layer                              [] Left Leg Single Layer [x] Left Leg Double Layer      Compression: Do not get leg(s) with wrap wet. If wraps become too tight call the center or completely remove the wrap. Apply: [] Three Layer Compression Wrap  []RightLeg []Left Leg  [] Four layer Compression Wrap      []RightLeg []Left Leg   [x]  Milikan two layer calamine LITE               [x] Right Leg   [] Left Leg       [x] Elevate leg(s) above the level of the heart when sitting. [x] Avoid prolonged standing in one place. Multilayer Compression Wrap   Below the Knee    Instructed patient/caregiver not to remove dressing and to keep it clean and dry. Instructed patient/caregiver on complications to report to provider, such as pain, numbness in toes, heavy drainage, and slippage of dressing. Instructed patient on purpose of compression dressing and on activity and exercise recommendations. Dietary:  [x] Diet as tolerated    [x] Increase Protein: examples (Meat, cheese, eggs, greek yogurt, fish, nuts)   [] Catrachito Therapeutic Nutrition Powder  [] Other:  [] Dial a Dietician : Call [employer] at [phone] enter code [phone]) when prompted. M-F 9am-5pm EST. Return Appointment:  [] Nurse Visit at wound center in *** days   [x] Return Appointment: With Dr. Sowmya Porras  in 1 week(s)  [] Ordered tests:     Electronically signed on 5/26/2022     PLEASE NOTE: IF YOU ARE UNABLE TO OBTAIN WOUND SUPPLIES, CONTINUE TO USE THE SUPPLIES YOU HAVE AVAILABLE UNTIL YOU ARE ABLE TO [facility]. IT IS MOST IMPORTANT TO KEEP THE WOUND COVERED AT ALL TIMES.      Physician Signature:_______________________  Dr. Sowmya Porras

## 2022-06-02 NOTE — WOUND CARE
06/02/22 1045   Right Leg Edema Point of Measurement   Leg circumference 46.5 cm   Ankle circumference 27.5 cm   Compression Therapy 2 layer compression wrap   Left Leg Edema Point of Measurement   Leg circumference 47.5 cm   Ankle circumference 29 cm   Compression Therapy Tubular elastic support bandage   LLE Peripheral Vascular    Capillary Refill Less than/equal to 3 seconds   Color Red;Pink   Temperature Warm   Pedal Pulse Palpable   RLE Peripheral Vascular    Capillary Refill Less than/equal to 3 seconds   Color Pink;Red   Temperature Warm   Pedal Pulse Palpable   Wound Leg lower Right;Medial #1   Date First Assessed/Time First Assessed: 05/05/22 1336   Present on Hospital Admission: Yes  Location: Leg lower  Wound Location Orientation: Right;Medial  Wound Description: #1  Wound Outcome: Converged   Wound Image     Dressing Status Old drainage noted   Cleansed Soap and water   Wound Length (cm) 0.7 cm  (Medial right leg )   Wound Width (cm) 0.5 cm   Wound Depth (cm) 0.2 cm   Wound Surface Area (cm^2) 0.35 cm^2   Change in Wound Size % 99.91   Wound Volume (cm^3) 0.07 cm^3   Wound Healing % 100   Wound Assessment Slough;Pink/red   Drainage Amount Small   Drainage Description Serosanguinous   Wound Odor None   Buffy-Wound/Incision Assessment Blanchable erythema;Dry/flaky   Edges Flat/open edges   Wound Thickness Description Partial thickness     Visit Vitals  BP (!) 164/87 (BP 1 Location: Right upper arm, BP Patient Position: Sitting)   Pulse 83   Temp 97.5 °F (36.4 °C)   Resp 19

## 2022-06-02 NOTE — PROGRESS NOTES
Wound Center  Progress Note / Procedure Note    Subjective:     Chief Complaint:  Annie Hill is a 73 y.o.  female  with leg wounds of >few months duration. HPI:     Legs weeping and swelling  Saw Dr Jamia Scott  Had studies  Didn't wish to come here so Dr Judah Camilo had been applying unnas in his office  Referred hre as wound on right getting worse    History/Chart/Medications reviewed    Since last visit:  No new issues      Wound caused by: swelling  Current wound care:See flowsheet  Offloading wound: yes  Appetite: good  Wound associated pain: See flowsheet  Diabetic: yes  Smoker: no  ROS: no N/V/D, no T/chills; no local rash, no chest pain or shortness of breath, no headache or dizzyness      Objective:     Physical Exam:   See flowsheet / nursing notes for vitals  Visit Vitals  BP (!) 164/87 (BP 1 Location: Right upper arm, BP Patient Position: Sitting)   Pulse 83   Temp 97.5 °F (36.4 °C)   Resp 19     General: NAD. Hygiene good  Psych: cooperative. No anxiety or depression. Normal mood and affect. Neuro: alert and oriented to person/place/situation. Otherwise nonfocal.  Derm: Normal  turgor for age, dry skin  HEENT: Normocephalic, atraumatic. EOMI. Conjunctiva clear. No scleral icterus. Neck: Normal range of motion. No masses. Chest: Respirations nonlabored  Lower extremities: color normal; temperature normal. Hair growth is not present. Calves are supple, nontender, approximately equally sized in comparison. Capillary refill <3 sec  Focussed Lower Extremity Exam:  Vascular exam:  Right lower extremity: moderate  edema, foot warm,   DP pulse : 1+  PT pulse: 0  Nails dystrophic   Left lower extremity: moderate  edema, foot warm,   DP pulse : 1+  PT pulse: 0   Nails dystrophic    Ulcer Description:   See Flowsheet         Data Review:              Assessment/Plan     73 y.o. female with diabetes, chronic leg edema    -R/ leg ulcer.   Full thickness- small area, primarily partial thickness  Clustered  smaller  Slough/granular  Necessitates debridement  for wound healing and to prevent/heal infection  See below    -L/ leg ulcer. Small superficial area on left leg  Use betadine      Order velcro wraps          - leg edema      Venous insuff  Get recrods from Dr Muse Shellfermin office    Dm2  Poor control per patient      Following discussed with patient   Needs :  Serial debridement- debrided today- see note below    Good local wound care  R/ leg  Dressing:  betadine wet to dry  Frequency : 2x/week  D/C HH      -Edema management  Remove dressing prior to showering  Do not get dressing wet    Edema management:  Elevate leg(s) throughout the day starting in the morning  Compression: unna lite 2 layer  Avoid prolonged standing      -Good Diabetic control    -Good offloading    Patient/ understood and agrees with plan. Questions answered. Serial visits and serial debridements - continue    Follow up with me in 1 week        Procedure:     Ulcer assessment: Due to presence of necrotic tissue within the wound bed, ulcer requires debridement. Procedure: Debridement:   The indication for debridement was reviewed with patient. Risks of procedure (bleeding, infection, pain) were discussed with patient and consent signed on first visit. Questions were answered      Subcutaneous debridement Right leg ulcer  Indication: to remove necrotic tissue/ vitalized and devitalized tissue/ infected tissue through skin and subcutaneous  layer of wound bed  Time out done  Consent in chart   Anesthesia: Topical  lidocaine   Instrument:  curette  Residual Necrosis: Present and scored   Bleeding: <1ml   Hemostasis: Pressure   Patient tolerated procedure well   Procedural Pain: 0  Post - procedural pain: 0    Post debridement measurements: see below  Surface area debrided: <20 sq.  Cm ( only certain areas debrided)    Wound Leg lower Right;Medial #1 (Active)   Wound Image    06/02/22 1045   Wound Etiology Venous 05/19/22 1055 Dressing Status Old drainage noted 06/02/22 1045   Cleansed Soap and water 06/02/22 1045   Dressing/Treatment Betadine swabs/Povidone Iodine;ABD pad;Gauze dressing/dressing sponge 05/26/22 1410   Offloading for Diabetic Foot Ulcers Offloading not required 05/12/22 1019   Wound Length (cm) 0.7 cm 06/02/22 1045   Wound Width (cm) 0.5 cm 06/02/22 1045   Wound Depth (cm) 0.2 cm 06/02/22 1045   Wound Surface Area (cm^2) 0.35 cm^2 06/02/22 1045   Change in Wound Size % 99.91 06/02/22 1045   Wound Volume (cm^3) 0.07 cm^3 06/02/22 1045   Wound Healing % 100 06/02/22 1045   Post-Procedure Length (cm) 0.7 cm 06/02/22 1121   Post-Procedure Width (cm) 0.5 cm 06/02/22 1121   Post-Procedure Depth (cm) 0.3 cm 06/02/22 1121   Post-Procedure Surface Area (cm^2) 0.35 cm^2 06/02/22 1121   Post-Procedure Volume (cm^3) 0.105 cm^3 06/02/22 1121   Wound Assessment Slough;Pink/red 06/02/22 1045   Drainage Amount Small 06/02/22 1045   Drainage Description Serosanguinous 06/02/22 1045   Wound Odor None 06/02/22 1045   Buffy-Wound/Incision Assessment Blanchable erythema;Dry/flaky 06/02/22 1045   Edges Flat/open edges 06/02/22 1045   Wound Thickness Description Partial thickness 06/02/22 1045   Number of days: 28       Wound Leg lower Left;Lateral #2 (Active)   Wound Etiology Venous 06/02/22 1120   Dressing/Treatment Betadine swabs/Povidone Iodine 06/02/22 1126   Wound Length (cm) 0.9 cm 06/02/22 1120   Wound Width (cm) 0.9 cm 06/02/22 1120   Wound Depth (cm) 0.2 cm 06/02/22 1120   Wound Surface Area (cm^2) 0.81 cm^2 06/02/22 1120   Wound Volume (cm^3) 0.162 cm^3 06/02/22 1120   Wound Assessment Slough 06/02/22 1120   Drainage Amount Small 06/02/22 1120   Drainage Description Serous 06/02/22 1120   Wound Odor None 06/02/22 1120   Buffy-Wound/Incision Assessment Edematous 06/02/22 1120   Edges Attached edges 06/02/22 1120   Number of days: 0          -------    Past Medical History:   Diagnosis Date    Allergies     Asthma     Cardiac arrhythmia     Diabetes ([facility] [address]     Hx of long term use of blood thinners     Hypertension     Thyroid disease       Past Surgical History:   Procedure Laterality Date    HX  SECTION      HX GYN       Family History   Problem Relation Age of Onset    Hypertension Other     Diabetes Other     Heart Disease Other       Social History     Tobacco Use    Smoking status: Never Smoker    Smokeless tobacco: Never Used   Substance Use Topics    Alcohol use: No       Prior to Admission medications    Medication Sig Start Date End Date Taking? Authorizing Provider   gabapentin (NEURONTIN) 300 mg capsule Take 300 mg by mouth nightly. Take 1 capsule by mouth every day at bedtime, and 1 capsule during the day as needed    Provider, Historical   amLODIPine-benazepril (LOTREL) 5-10 mg per capsule Take 1 Capsule by mouth daily. Provider, Historical   amLODIPine (NORVASC) 5 mg tablet Take 5 mg by mouth daily. Provider, Historical   oxybutynin (DITROPAN) 5 mg tablet Take 5 mg by mouth two (2) times a day. Provider, Historical   fluocinoNIDE (LIDEX) 0.05 % ointment Apply  to affected area two (2) times a day. 21   Brendan Arenas MD   loratadine (CLARITIN) 10 mg tablet TAKE 1 TABLET BY MOUTH EVERY DAY 20   Brendan Arenas MD   levothyroxine (SYNTHROID) 25 mcg tablet TAKE 1 TABLET BY MOUTH EVERY DAY  Patient taking differently: 50 mcg. TAKE 1 TABLET BY MOUTH EVERY DAY 20   Brendan Arenas MD   Jardiance 25 mg tablet TAKE 1 TABLET BY MOUTH EVERY DAY 20   Brendan Arenas MD   busPIRone (BUSPAR) 5 mg tablet Take 1 Tab by mouth three (3) times daily. Indications: repeated episodes of anxiety 20   Brendan Arenas MD   DULoxetine (CYMBALTA) 20 mg capsule Take 1 Cap by mouth two (2) times a day. Indications: anxiousness associated with depression  Patient taking differently: Take 30 mg by mouth two (2) times a day.  Indications: anxiousness associated with depression 20 Otilio Costa MD   SITagliptin-metFORMIN (Janumet XR) 50-1,000 mg TM24 TAKE 1 TABLET BY MOUTH TWICE A DAY 12/7/20   Otilio Costa MD   warfarin (COUMADIN) 2 mg tablet Take 6 mg by mouth daily. 8/14/20   Provider, Historical   sotaloL (BETAPACE) 80 mg tablet Take 80 mg by mouth two (2) times a day. 8/3/20   Provider, Historical   cyclobenzaprine (FLEXERIL) 10 mg tablet Take 10 mg by mouth three (3) times daily. 6/11/20   Provider, Historical   clotrimazole (LOTRIMIN) 1 % topical cream Apply  to affected area two (2) times a day. On FEET 9/1/20   Otilio Costa MD   betamethasone dipropionate (DIPROSONE) 0.05 % topical cream Apply  to affected area two (2) times daily as needed for Skin Irritation. Of face 9/1/20   Otilio Costa MD   albuterol (PROVENTIL HFA, VENTOLIN HFA, PROAIR HFA) 90 mcg/actuation inhaler Take 2 Puffs by inhalation every four (4) hours as needed for Wheezing.     Other, MD Nahid     Allergies   Allergen Reactions    Cephalexin Unknown (comments)    Sulfa (Sulfonamide Antibiotics) Unknown (comments)    Zofran [Ondansetron Hcl] Unknown (comments)          Signed By: Diego Villeda MD     June 2, 2022

## 2022-06-09 ENCOUNTER — HOSPITAL ENCOUNTER (OUTPATIENT)
Dept: WOUND CARE | Age: 67
Discharge: HOME | End: 2022-06-09
Payer: MEDICAID

## 2022-06-09 VITALS
HEART RATE: 89 BPM | TEMPERATURE: 97.3 F | RESPIRATION RATE: 20 BRPM | DIASTOLIC BLOOD PRESSURE: 84 MMHG | SYSTOLIC BLOOD PRESSURE: 164 MMHG

## 2022-06-09 DIAGNOSIS — L97.222 CALF ULCER, LEFT, WITH FAT LAYER EXPOSED (HCC): Primary | ICD-10-CM

## 2022-06-09 DIAGNOSIS — L97.212 CHRONIC ULCER OF RIGHT CALF WITH FAT LAYER EXPOSED (HCC): ICD-10-CM

## 2022-06-09 PROCEDURE — 11042 DBRDMT SUBQ TIS 1ST 20SQCM/<: CPT

## 2022-06-09 PROCEDURE — 74011000250 HC RX REV CODE- 250: Performed by: FAMILY MEDICINE

## 2022-06-09 RX ORDER — LIDOCAINE HYDROCHLORIDE 20 MG/ML
JELLY TOPICAL ONCE
Status: CANCELLED | OUTPATIENT
Start: 2022-06-09 | End: 2022-06-09

## 2022-06-09 RX ORDER — BACITRACIN 500 [USP'U]/G
OINTMENT TOPICAL ONCE
Status: CANCELLED | OUTPATIENT
Start: 2022-06-09 | End: 2022-06-09

## 2022-06-09 RX ORDER — LIDOCAINE 50 MG/G
OINTMENT TOPICAL ONCE
Status: CANCELLED | OUTPATIENT
Start: 2022-06-09 | End: 2022-06-09

## 2022-06-09 RX ORDER — GENTAMICIN SULFATE 1 MG/G
OINTMENT TOPICAL ONCE
Status: CANCELLED | OUTPATIENT
Start: 2022-06-09 | End: 2022-06-09

## 2022-06-09 RX ORDER — LIDOCAINE HYDROCHLORIDE 40 MG/ML
SOLUTION TOPICAL ONCE
Status: CANCELLED | OUTPATIENT
Start: 2022-06-09 | End: 2022-06-09

## 2022-06-09 RX ORDER — TRIAMCINOLONE ACETONIDE 1 MG/G
OINTMENT TOPICAL ONCE
Status: CANCELLED | OUTPATIENT
Start: 2022-06-09 | End: 2022-06-09

## 2022-06-09 RX ORDER — LIDOCAINE 40 MG/G
CREAM TOPICAL ONCE
Status: CANCELLED | OUTPATIENT
Start: 2022-06-09 | End: 2022-06-09

## 2022-06-09 RX ORDER — MUPIROCIN 20 MG/G
OINTMENT TOPICAL ONCE
Status: CANCELLED | OUTPATIENT
Start: 2022-06-09 | End: 2022-06-09

## 2022-06-09 RX ORDER — CLOBETASOL PROPIONATE 0.5 MG/G
OINTMENT TOPICAL ONCE
Status: CANCELLED | OUTPATIENT
Start: 2022-06-09 | End: 2022-06-09

## 2022-06-09 RX ADMIN — Medication: at 13:35

## 2022-06-09 NOTE — DISCHARGE INSTRUCTIONS
Discharge Instructions for  [facility]  [facility] [address] [place], [address] [facility]  Telephone: [phone]    [address] [place] [address] Information: Should you experience any significant changes in your wound(s) or have questions about your wound care, please contact the [facility] Main at [address] [place] [address] Street 8:00 am - 4:30. If you need help with your wound outside these hours and cannot wait until we are again available, contact your PCP or go to the hospital emergency room. NAME:  Gabriele Jones  YOB: 1955  DATE:  6/2/2022    : Margi Mathew     [x] [facility]: [facility] - Discharge from home health - patient to keep wrap on for a full week and to be changed in clinic - thanks! Wound Cleansing:   Do not scrub or use excessive force. Cleanse wound prior to applying a clean dressing with:   [x] Keep Wound Dry in Shower - may purchase a cast cover at local pharmacy or sponge bath    [] Cleanse wound with Mild Soap & Water    [] May Shower at Discharge: remove dressing 1st, redress wound right after with a new dressing  [] Do not shower  [x] cleanse with baby shampoo lather leave 2-3 then rinse with water on dressing changes     Topical Treatments:  Do not apply lotions, creams, or ointments to wound bed unless directed. [x] Apply moisturizing lotion A&D ointment to skin surrounding the wound prior to dressing change.   [] Other:     Dressings:           Wound Location Right  lower legs     Apply Primary Dressing:      [x] betadine wet to dry, 2 layer calamine lite wrap    Cover and Secure with:  [] Gauze [x] ABD  [] exudry     [] Ajay [] Kerlix [] Mepilex Border  [] Ace Wrap [] Roll Tape    [x] Other: 2 layer Milikan calamine LITE    Change dressing:    [x] Twice per week    Dressings:           Wound Location Left lower leg     Apply Primary Dressing:          [x] Other: A&D ointment to lower leg, double tubi        Edema Control: Every morning immediately when getting up should be applied to affected leg(s) from mid foot to knee making sure to cover the heel. Remove every night before going to bed if desired. Apply: [] Compression Stocking      []Right Leg           []Left Leg   [x] Tubigrip {tubigrip:25905}   [] Right Leg Single Layer  [] Right Leg Double Layer                              [] Left Leg Single Layer [x] Left Leg Double Layer      Compression: Do not get leg(s) with wrap wet. If wraps become too tight call the center or completely remove the wrap. Apply: [] Three Layer Compression Wrap  []RightLeg []Left Leg  [] Four layer Compression Wrap      []RightLeg []Left Leg   [x]  Milikan two layer calamine LITE               [x] Right Leg   [] Left Leg       [x] Elevate leg(s) above the level of the heart when sitting. [x] Avoid prolonged standing in one place. Multilayer Compression Wrap   Below the Knee    Instructed patient/caregiver not to remove dressing and to keep it clean and dry. Instructed patient/caregiver on complications to report to provider, such as pain, numbness in toes, heavy drainage, and slippage of dressing. Instructed patient on purpose of compression dressing and on activity and exercise recommendations. Dietary:  [x] Diet as tolerated    [x] Increase Protein: examples (Meat, cheese, eggs, greek yogurt, fish, nuts)   [] Catrachito Therapeutic Nutrition Powder  [] Other:  [] Dial a Dietician : Call [employer] at [phone] enter code [phone]) when prompted. M-F 9am-5pm EST. Return Appointment:  [] Nurse Visit at wound center in *** days   [x] Return Appointment: With Dr. Baldo Mendoza  in 1 week(s)  [] Ordered tests:     Electronically signed on 6/2/2022     PLEASE NOTE: IF YOU ARE UNABLE TO OBTAIN WOUND SUPPLIES, CONTINUE TO USE THE SUPPLIES YOU HAVE AVAILABLE UNTIL YOU ARE [facility]. IT IS MOST IMPORTANT TO KEEP THE WOUND COVERED AT ALL TIMES.      Physician Signature:_______________________  Dr. Josefa Colunga

## 2022-06-09 NOTE — WOUND CARE
06/09/22 1354   Right Leg Edema Point of Measurement   Compression Therapy 2 layer compression wrap   Left Leg Edema Point of Measurement   Compression Therapy Velcro compression wrap   Wound Leg lower Left;Lateral #2   Date First Assessed/Time First Assessed: 06/02/22 1120   Present on Hospital Admission: Yes  Location: Leg lower  Wound Location Orientation: Left;Lateral  Wound Description: #2   Dressing/Treatment Other (Comment)  (A&D ointment)   Wound Leg lower Right;Medial #1   Date First Assessed/Time First Assessed: 05/05/22 1336   Present on Hospital Admission: Yes  Location: Leg lower  Wound Location Orientation: Right;Medial  Wound Description: #1  Wound Outcome: Converged   Dressing/Treatment Betadine swabs/Povidone Iodine;Gauze dressing/dressing sponge   Discharge Condition: Stable     Pain: 0    Ambulatory Status: Walking    Discharge Destination: Home     Transportation: Car    Accompanied by: Self     Discharge instructions reviewed with Patient and copy or written instructions have been provided. All questions/concerns have been addressed at this time. Multilayer Compression Wrap   (Not Unna) Below the Knee    NAME:  Johnathan Armendariz OF BIRTH:  1955  MEDICAL RECORD NUMBER:  [id number]  DATE:  6/9/2022    Removed old Multilayer wrap if indicated and wash leg with mild soap/water. Applied moisturizing agent to dry skin as needed. Applied primary and secondary dressing as ordered. Applied multilayered dressing below the knee to right lower leg. Instructed patient/caregiver not to remove dressing and to keep it clean and dry. Instructed patient/caregiver on complications to report to provider, such as pain, numbness in toes, heavy drainage, and slippage of dressing. Instructed patient on purpose of compression dressing and on activity and exercise recommendations.       Electronically signed by Cee Vee RN on 6/9/2022 at 1:55 PM

## 2022-06-09 NOTE — WOUND CARE
06/09/22 1326   Anesthetic   Anesthetic 4% Lidocaine Liquid Topical   Right Leg Edema Point of Measurement   Leg circumference 41 cm   Ankle circumference 27 cm   Left Leg Edema Point of Measurement   Leg circumference 48 cm   Ankle circumference 28.5 cm   LLE Peripheral Vascular    Capillary Refill Less than/equal to 3 seconds   Color Appropriate for race   Temperature Warm   Pedal Pulse Palpable   RLE Peripheral Vascular    Capillary Refill Less than/equal to 3 seconds   Color Appropriate for race   Temperature Warm   Pedal Pulse Palpable   Wound Leg lower Left;Lateral #2   Date First Assessed/Time First Assessed: 06/02/22 1120   Present on Hospital Admission: Yes  Location: Leg lower  Wound Location Orientation: Left;Lateral  Wound Description: #2   Wound Image    Cleansed Soap and water   Wound Length (cm) 0.9 cm   Wound Width (cm) 0.9 cm   Wound Depth (cm) 0 cm   Wound Surface Area (cm^2) 0.81 cm^2   Change in Wound Size % 0   Wound Volume (cm^3) 0 cm^3   Wound Healing % 100   Wound Assessment Other (Comment)  (scabbed)   Drainage Amount None   Wound Odor None   Wound Leg lower Right;Medial #1   Date First Assessed/Time First Assessed: 05/05/22 1336   Present on Hospital Admission: Yes  Location: Leg lower  Wound Location Orientation: Right;Medial  Wound Description: #1  Wound Outcome: Converged   Wound Image    Cleansed Soap and water   Wound Length (cm) 0.4 cm   Wound Width (cm) 0.4 cm   Wound Depth (cm) 0.1 cm   Wound Surface Area (cm^2) 0.16 cm^2   Change in Wound Size % 99.96   Wound Volume (cm^3) 0.016 cm^3   Wound Healing % 100   Wound Assessment Slough;Pink/red   Drainage Amount Moderate   Drainage Description Serous   Wound Odor None   Buffy-Wound/Incision Assessment Blanchable erythema   Edges Flat/open edges   Pain 1   Pain Scale 1 Numeric (0 - 10)   Pain Intensity 1 0     Visit Vitals  BP (!) 164/84 (BP 1 Location: Right lower arm, BP Patient Position: Sitting)   Pulse 89   Temp 97.3 °F (36.3 °C) Resp 20

## 2022-06-09 NOTE — PROGRESS NOTES
Wound Center  Progress Note / Procedure Note    Subjective:     Chief Complaint:  Marques Villalba is a 73 y.o.  female  with leg wounds of >few months duration. HPI:     Legs weeping and swelling  Saw Dr Houston Olivares  Had studies  Didn't wish to come here so Dr Lam Rivera had been applying unnas in his office  Referred hre as wound on right getting worse    History/Chart/Medications reviewed    Since last visit:  No new issues      Wound caused by: swelling  Current wound care:See flowsheet  Offloading wound: yes  Appetite: good  Wound associated pain: See flowsheet  Diabetic: yes  Smoker: no  ROS: no N/V/D, no T/chills; no local rash, no chest pain or shortness of breath, no headache or dizzyness      Objective:     Physical Exam:   See flowsheet / nursing notes for vitals  Visit Vitals  BP (!) 164/84 (BP 1 Location: Right lower arm, BP Patient Position: Sitting)   Pulse 89   Temp 97.3 °F (36.3 °C)   Resp 20     General: NAD. Hygiene good  Psych: cooperative. No anxiety or depression. Normal mood and affect. Neuro: alert and oriented to person/place/situation. Otherwise nonfocal.  Derm: Normal  turgor for age, dry skin  HEENT: Normocephalic, atraumatic. EOMI. Conjunctiva clear. No scleral icterus. Neck: Normal range of motion. No masses. Chest: Respirations nonlabored  Lower extremities: color normal; temperature normal. Hair growth is not present. Calves are supple, nontender, approximately equally sized in comparison. Capillary refill <3 sec  Focussed Lower Extremity Exam:  Vascular exam:  Right lower extremity: moderate  edema, foot warm,   DP pulse : 1+  PT pulse: 0  Nails dystrophic   Left lower extremity: moderate  edema, foot warm,   DP pulse : 1+  PT pulse: 0   Nails dystrophic    Ulcer Description:   See Flowsheet         Data Review:              Assessment/Plan     73 y.o. female with diabetes, chronic leg edema    -R/ leg ulcer.   Full thickness- small area  smaller  Slough/granular  Necessitates debridement  for wound healing and to prevent/heal infection  See below    -L/ leg ulcer. Small superficial area on left leg  Dry scab now  Use betadine       velcro wraps- received          - leg edema      Venous insuff  Get recrods from Dr Warren Baez office    Dm2  Poor control per patient      Following discussed with patient   Needs :  Serial debridement- debrided today- see note below    Good local wound care  R/ leg  Dressing:  betadine wet to dry  Frequency : 1x/week        -Edema management  Remove dressing prior to showering  Do not get dressing wet    Edema management:  Elevate leg(s) throughout the day starting in the morning  Compression: unna lite 2 layer to right, velcro wrap to left  Avoid prolonged standing      -Good Diabetic control    -Good offloading    Patient/ understood and agrees with plan. Questions answered. Serial visits and serial debridements - continue    Follow up with me in 1 week        Procedure:     Ulcer assessment: Due to presence of necrotic tissue within the wound bed, ulcer requires debridement. Procedure: Debridement:   The indication for debridement was reviewed with patient. Risks of procedure (bleeding, infection, pain) were discussed with patient and consent signed on first visit. Questions were answered      Subcutaneous debridement Right leg ulcer  Indication: to remove necrotic tissue/ vitalized and devitalized tissue/ infected tissue through skin and subcutaneous  layer of wound bed  Time out done  Consent in chart   Anesthesia: Topical  lidocaine   Instrument:  curette  Residual Necrosis: Present and scored   Bleeding: <1ml   Hemostasis: Pressure   Patient tolerated procedure well   Procedural Pain: 0  Post - procedural pain: 0    Post debridement measurements: see below  Surface area debrided: <20 sq.  Cm    Wound Leg lower Right;Medial #1 (Active)   Wound Image   06/09/22 1326   Wound Etiology Venous 05/19/22 1055   Dressing Status Old drainage noted 06/02/22 1045 Cleansed Soap and water 06/09/22 1326   Dressing/Treatment Betadine swabs/Povidone Iodine;Gauze dressing/dressing sponge 06/09/22 1354   Offloading for Diabetic Foot Ulcers Offloading not required 05/12/22 1019   Wound Length (cm) 0.4 cm 06/09/22 1326   Wound Width (cm) 0.4 cm 06/09/22 1326   Wound Depth (cm) 0.1 cm 06/09/22 1326   Wound Surface Area (cm^2) 0.16 cm^2 06/09/22 1326   Change in Wound Size % 99.96 06/09/22 1326   Wound Volume (cm^3) 0.016 cm^3 06/09/22 1326   Wound Healing % 100 06/09/22 1326   Post-Procedure Length (cm) 0.4 cm 06/09/22 1345   Post-Procedure Width (cm) 0.4 cm 06/09/22 1345   Post-Procedure Depth (cm) 0.2 cm 06/09/22 1345   Post-Procedure Surface Area (cm^2) 0.16 cm^2 06/09/22 1345   Post-Procedure Volume (cm^3) 0.032 cm^3 06/09/22 1345   Wound Assessment Slough;Pink/red 06/09/22 1326   Drainage Amount Moderate 06/09/22 1326   Drainage Description Serous 06/09/22 1326   Wound Odor None 06/09/22 1326   Buffy-Wound/Incision Assessment Blanchable erythema 06/09/22 1326   Edges Flat/open edges 06/09/22 1326   Wound Thickness Description Full thickness 06/02/22 1045   Number of days: 35       Wound Leg lower Left;Lateral #2 (Active)   Wound Image   06/09/22 1326   Wound Etiology Venous 06/02/22 1120   Cleansed Soap and water 06/09/22 1326   Dressing/Treatment Other (Comment) 06/09/22 1354   Wound Length (cm) 0.9 cm 06/09/22 1326   Wound Width (cm) 0.9 cm 06/09/22 1326   Wound Depth (cm) 0 cm 06/09/22 1326   Wound Surface Area (cm^2) 0.81 cm^2 06/09/22 1326   Change in Wound Size % 0 06/09/22 1326   Wound Volume (cm^3) 0 cm^3 06/09/22 1326   Wound Healing % 100 06/09/22 1326   Wound Assessment Other (Comment) 06/09/22 1326   Drainage Amount None 06/09/22 1326   Drainage Description Serous 06/02/22 1120   Wound Odor None 06/09/22 1326   Buffy-Wound/Incision Assessment Edematous 06/02/22 1120   Edges Attached edges 06/02/22 1120   Number of days: 7          -------    Past Medical History: Diagnosis Date    Allergies     Asthma     Cardiac arrhythmia     Diabetes ([facility] [address]     Hx of long term use of blood thinners     Hypertension     Thyroid disease       Past Surgical History:   Procedure Laterality Date    HX  SECTION      HX GYN       Family History   Problem Relation Age of Onset    Hypertension Other     Diabetes Other     Heart Disease Other       Social History     Tobacco Use    Smoking status: Never Smoker    Smokeless tobacco: Never Used   Substance Use Topics    Alcohol use: No       Prior to Admission medications    Medication Sig Start Date End Date Taking? Authorizing Provider   gabapentin (NEURONTIN) 300 mg capsule Take 300 mg by mouth nightly. Take 1 capsule by mouth every day at bedtime, and 1 capsule during the day as needed   Yes Provider, Historical   amLODIPine-benazepril (LOTREL) 5-10 mg per capsule Take 1 Capsule by mouth daily. Yes Provider, Historical   amLODIPine (NORVASC) 5 mg tablet Take 5 mg by mouth daily. Yes Provider, Historical   oxybutynin (DITROPAN) 5 mg tablet Take 5 mg by mouth two (2) times a day. Yes Provider, Historical   fluocinoNIDE (LIDEX) 0.05 % ointment Apply  to affected area two (2) times a day. 21  Yes Amilcar Sahu MD   loratadine (CLARITIN) 10 mg tablet TAKE 1 TABLET BY MOUTH EVERY DAY 20  Yes Amilcar Sahu MD   levothyroxine (SYNTHROID) 25 mcg tablet TAKE 1 TABLET BY MOUTH EVERY DAY  Patient taking differently: 50 mcg. TAKE 1 TABLET BY MOUTH EVERY DAY 20  Yes Amilcar Sahu MD   Jardiance 25 mg tablet TAKE 1 TABLET BY MOUTH EVERY DAY 20  Yes Amilcar Sahu MD   busPIRone (BUSPAR) 5 mg tablet Take 1 Tab by mouth three (3) times daily. Indications: repeated episodes of anxiety 20  Yes Amilcar Sahu MD   DULoxetine (CYMBALTA) 20 mg capsule Take 1 Cap by mouth two (2) times a day.  Indications: anxiousness associated with depression  Patient taking differently: Take 30 mg by mouth two (2) times a day. Indications: anxiousness associated with depression 12/7/20  Yes Missy Eisenberg MD   SITagliptin-metFORMIN (Janumet XR) 50-1,000 mg TM24 TAKE 1 TABLET BY MOUTH TWICE A DAY 12/7/20  Yes Missy Eisenberg MD   warfarin (COUMADIN) 2 mg tablet Take 6 mg by mouth daily. 8/14/20  Yes Provider, Historical   sotaloL (BETAPACE) 80 mg tablet Take 80 mg by mouth two (2) times a day. 8/3/20  Yes Provider, Historical   cyclobenzaprine (FLEXERIL) 10 mg tablet Take 10 mg by mouth three (3) times daily. 6/11/20  Yes Provider, Historical   clotrimazole (LOTRIMIN) 1 % topical cream Apply  to affected area two (2) times a day. On FEET 9/1/20  Yes Missy Eisenberg MD   betamethasone dipropionate (DIPROSONE) 0.05 % topical cream Apply  to affected area two (2) times daily as needed for Skin Irritation. Of face 9/1/20  Yes Missy Eisenberg MD   albuterol (PROVENTIL HFA, VENTOLIN HFA, PROAIR HFA) 90 mcg/actuation inhaler Take 2 Puffs by inhalation every four (4) hours as needed for Wheezing.    Yes Other, MD Nahid     Allergies   Allergen Reactions    Cephalexin Unknown (comments)    Sulfa (Sulfonamide Antibiotics) Unknown (comments)    Zofran [Ondansetron Hcl] Unknown (comments)          Signed By: Baldo Mendoza MD     June 9, 2022

## 2022-06-16 ENCOUNTER — HOSPITAL ENCOUNTER (OUTPATIENT)
Dept: WOUND CARE | Age: 67
Discharge: HOME | End: 2022-06-16
Payer: MEDICAID

## 2022-06-16 VITALS
SYSTOLIC BLOOD PRESSURE: 147 MMHG | HEART RATE: 88 BPM | RESPIRATION RATE: 18 BRPM | TEMPERATURE: 97 F | DIASTOLIC BLOOD PRESSURE: 79 MMHG

## 2022-06-16 DIAGNOSIS — L97.222 CALF ULCER, LEFT, WITH FAT LAYER EXPOSED (HCC): Primary | ICD-10-CM

## 2022-06-16 DIAGNOSIS — L97.212 CHRONIC ULCER OF RIGHT CALF WITH FAT LAYER EXPOSED (HCC): ICD-10-CM

## 2022-06-16 PROCEDURE — 99211 OFF/OP EST MAY X REQ PHY/QHP: CPT

## 2022-06-16 PROCEDURE — 74011000250 HC RX REV CODE- 250: Performed by: FAMILY MEDICINE

## 2022-06-16 RX ORDER — MUPIROCIN 20 MG/G
OINTMENT TOPICAL ONCE
Status: CANCELLED | OUTPATIENT
Start: 2022-06-16 | End: 2022-06-16

## 2022-06-16 RX ORDER — LIDOCAINE HYDROCHLORIDE 20 MG/ML
JELLY TOPICAL ONCE
Status: CANCELLED | OUTPATIENT
Start: 2022-06-16 | End: 2022-06-16

## 2022-06-16 RX ORDER — CLOBETASOL PROPIONATE 0.5 MG/G
OINTMENT TOPICAL ONCE
Status: CANCELLED | OUTPATIENT
Start: 2022-06-16 | End: 2022-06-16

## 2022-06-16 RX ORDER — GENTAMICIN SULFATE 1 MG/G
OINTMENT TOPICAL ONCE
Status: CANCELLED | OUTPATIENT
Start: 2022-06-16 | End: 2022-06-16

## 2022-06-16 RX ORDER — LIDOCAINE HYDROCHLORIDE 40 MG/ML
SOLUTION TOPICAL ONCE
Status: CANCELLED | OUTPATIENT
Start: 2022-06-16 | End: 2022-06-16

## 2022-06-16 RX ORDER — BACITRACIN 500 [USP'U]/G
OINTMENT TOPICAL ONCE
Status: CANCELLED | OUTPATIENT
Start: 2022-06-16 | End: 2022-06-16

## 2022-06-16 RX ORDER — LIDOCAINE 50 MG/G
OINTMENT TOPICAL ONCE
Status: CANCELLED | OUTPATIENT
Start: 2022-06-16 | End: 2022-06-16

## 2022-06-16 RX ORDER — TRIAMCINOLONE ACETONIDE 1 MG/G
OINTMENT TOPICAL ONCE
Status: CANCELLED | OUTPATIENT
Start: 2022-06-16 | End: 2022-06-16

## 2022-06-16 RX ORDER — LIDOCAINE 40 MG/G
CREAM TOPICAL ONCE
Status: CANCELLED | OUTPATIENT
Start: 2022-06-16 | End: 2022-06-16

## 2022-06-16 RX ADMIN — Medication: at 14:14

## 2022-06-16 NOTE — DISCHARGE INSTRUCTIONS
Discharge Instructions for  [facility]  [address], [address] [facility]  Telephone: [phone] [address] [facility] Information: Should you experience any significant changes in your wound(s) or have questions about your wound care, please contact the [facility] Main at [address] 8:00 am - 4:30. If you need help with your wound outside these hours and cannot wait until we are again available, contact your PCP or go to the hospital emergency room. NAME:  Leia Uribe  YOB: 1955  DATE:  6/16/2022    : Baldev Meraz velalden wraps on both legs daily, remove at night, and put on in the morning     (tubi  in clinic to right leg for now, pharrow wrap to left leg)    Topical Treatments:  [x] Apply moisturizing lotion A&D ointment to bilateral lower leg daily  [] Other:      Electronically signed on 6/16/2022     PLEASE NOTE: IF YOU ARE UNABLE TO OBTAIN [facility] motify , CONTINUE TO USE THE SUPPLIES YOU HAVE AVAILABLE UNTIL YOU ARE ABLE TO [facility]. IT IS MOST IMPORTANT TO KEEP THE WOUND COVERED AT ALL TIMES. Physician Signature:_______________________  Dr. Timo Russell treatment has been completed at [facility] outpatient wound clinic on 6/16/2022, per Dr. Arlyn Velázquez. We know patients have several options when choosing a health care provider. We would like to express our sincere appreciation for having had the chance to be yours. More importantly, we enjoy having you as part of our family. We also hope your experience with us has surpassed your expectations and that you have been pleased with our service. We appreciate the confidence you've shown in selecting us as your health care provider and we will continue or commitment to provide the highest quality of service. Again, thank you for choosing us for your health care needs.  If you have any further questions or concerns, please call [phone]. It has been our pleasure serving you and we hope to continue our relationship in the future, if the need occurs.      Sincerely,  [address]

## 2022-06-16 NOTE — DISCHARGE INSTRUCTIONS
Discharge Instructions for  [facility]  [address] [place], [address] [facility]  Telephone: [phone]    [address] [place] [address] Information: Should you experience any significant changes in your wound(s) or have questions about your wound care, please contact the [facility] Main at [address] [place] [address] 8:00 am - 4:30. If you need help with your wound outside these hours and cannot wait until we are again available, contact your PCP or go to the hospital emergency room. NAME:  Geronimo Colon  YOB: 1955  DATE:  6/9/2022    : Yoel Winslow     Wound Cleansing:   Do not scrub or use excessive force. Cleanse wound prior to applying a clean dressing with:   [x] Keep Wound Dry in Shower - may purchase a cast cover at local pharmacy or sponge bath    [] Cleanse wound with Mild Soap & Water    [] May Shower at Discharge: remove dressing 1st, redress wound right after with a new dressing  [] Do not shower  [x] cleanse with baby shampoo lather leave 2-3 then rinse with water on dressing changes     Topical Treatments:  Do not apply lotions, creams, or ointments to wound bed unless directed. [x] Apply moisturizing lotion A&D ointment to skin surrounding the wound prior to dressing change.   [] Other:     Dressings:           Wound Location Right  lower legs     Apply Primary Dressing:      [x] betadine wet to dry, 2 layer calamine lite wrap    Cover and Secure with:  [] Gauze [x] ABD  [] exudry     [] Ajay [] Kerlix [] Mepilex Border  [] Ace Wrap [] Roll Tape    [x] Other: 2 layer [place] calamine LITE    Change dressing:    [x] Twice per week    Dressings:           Wound Location Left lower leg     Apply Primary Dressing:          [x] Other: A&D ointment to lower leg, velcro wrap       Edema Control: Every morning immediately when getting up should be applied to affected leg(s) from mid foot to knee making sure to cover the heel.  Remove every night before going to bed if desired. Apply: [x] Compression Stocking      []Right Leg           [x]Left Leg   [] Tubigrip {tubigrip:86969}   [] Right Leg Single Layer  [] Right Leg Double Layer                              [] Left Leg Single Layer [] Left Leg Double Layer      Compression: Do not get leg(s) with wrap wet. If wraps become too tight call the center or completely remove the wrap. Apply: [] Three Layer Compression Wrap  []RightLeg []Left Leg  [] Four layer Compression Wrap      []RightLeg []Left Leg   [x]  Milikan two layer calamine LITE               [x] Right Leg   [] Left Leg       [x] Elevate leg(s) above the level of the heart when sitting. [x] Avoid prolonged standing in one place. Multilayer Compression Wrap   Below the Knee    Instructed patient/caregiver not to remove dressing and to keep it clean and dry. Instructed patient/caregiver on complications to report to provider, such as pain, numbness in toes, heavy drainage, and slippage of dressing. Instructed patient on purpose of compression dressing and on activity and exercise recommendations. Dietary:  [x] Diet as tolerated    [x] Increase Protein: examples (Meat, cheese, eggs, greek yogurt, fish, nuts)   [] Catrachito Therapeutic Nutrition Powder  [] Other:  [] Dial a Dietician : Call [facility] at [phone] enter code [phone]) when prompted. M-F 9am-5pm EST. Return Appointment:  [] Nurse Visit at wound center in *** days   [x] Return Appointment: With Dr. Sherron Burleson  in 1 week(s)  [] Ordered tests:     Electronically signed on 6/9/2022     PLEASE NOTE: IF YOU ARE UNABLE TO OBTAIN WOUND SUPPLIES, CONTINUE TO USE THE SUPPLIES YOU HAVE AVAILABLE UNTIL YOU ARE ABLE TO [facility]. IT IS MOST IMPORTANT TO KEEP THE WOUND COVERED AT ALL TIMES.      Physician Signature:_______________________  Dr. Sherron Burleson

## 2022-06-16 NOTE — PROGRESS NOTES
Wound Center  Progress Note     Subjective:     Chief Complaint:  Daniele Daniel is a 73 y.o.  female  with leg wounds of >few months duration. HPI:     Legs weeping and swelling  Saw Dr Vj Villaseñor  Had studies  Didn't wish to come here so Dr Flower Ugalde had been applying unnas in his office  Referred hre as wound on right getting worse    History/Chart/Medications reviewed    Since last visit:  No new issues      Wound caused by: swelling  Current wound care:See flowsheet  Offloading wound: yes  Appetite: good  Wound associated pain: See flowsheet  Diabetic: yes  Smoker: no  ROS: no N/V/D, no T/chills; no local rash, no chest pain or shortness of breath, no headache or dizzyness      Objective:     Physical Exam:   See flowsheet / nursing notes for vitals  Visit Vitals  BP (!) 147/79 (BP 1 Location: Right upper arm, BP Patient Position: Sitting)   Pulse 88   Temp 97 °F (36.1 °C)   Resp 18     General: NAD. Hygiene good  Psych: cooperative. No anxiety or depression. Normal mood and affect. Neuro: alert and oriented to person/place/situation. Otherwise nonfocal.  Derm: Normal  turgor for age, dry skin  HEENT: Normocephalic, atraumatic. EOMI. Conjunctiva clear. No scleral icterus. Neck: Normal range of motion. No masses. Chest: Respirations nonlabored  Lower extremities: color normal; temperature normal. Hair growth is not present. Calves are supple, nontender, approximately equally sized in comparison. Capillary refill <3 sec  Focussed Lower Extremity Exam:  Vascular exam:  Right lower extremity: moderate  edema, foot warm,   DP pulse : 1+  PT pulse: 0  Nails dystrophic   Left lower extremity: moderate  edema, foot warm,   DP pulse : 1+  PT pulse: 0   Nails dystrophic    Ulcer Description:   See Flowsheet         Data Review:              Assessment/Plan     73 y.o. female with diabetes, chronic leg edema    -R/ leg ulcer. Healed    -L/ leg ulcer.   Healed    Prevention discussed  Vit A&D ointment or vaseline ointment few times daily  Leave open, no bandaid    Cont to Elevate legs throughout day and wear compression    Follow up prn          -------    Past Medical History:   Diagnosis Date    Allergies     Asthma     Cardiac arrhythmia     Diabetes ([address]     Hx of long term use of blood thinners     Hypertension     Thyroid disease       Past Surgical History:   Procedure Laterality Date    HX  SECTION      HX GYN       Family History   Problem Relation Age of Onset    Hypertension Other     Diabetes Other     Heart Disease Other       Social History     Tobacco Use    Smoking status: Never Smoker    Smokeless tobacco: Never Used   Substance Use Topics    Alcohol use: No       Prior to Admission medications    Medication Sig Start Date End Date Taking? Authorizing Provider   gabapentin (NEURONTIN) 300 mg capsule Take 300 mg by mouth nightly. Take 1 capsule by mouth every day at bedtime, and 1 capsule during the day as needed    Provider, Historical   amLODIPine-benazepril (LOTREL) 5-10 mg per capsule Take 1 Capsule by mouth daily. Provider, Historical   amLODIPine (NORVASC) 5 mg tablet Take 5 mg by mouth daily. Provider, Historical   oxybutynin (DITROPAN) 5 mg tablet Take 5 mg by mouth two (2) times a day. Provider, Historical   fluocinoNIDE (LIDEX) 0.05 % ointment Apply  to affected area two (2) times a day. 21   Wally Mesa MD   loratadine (CLARITIN) 10 mg tablet TAKE 1 TABLET BY MOUTH EVERY DAY 20   Wally Mesa MD   levothyroxine (SYNTHROID) 25 mcg tablet TAKE 1 TABLET BY MOUTH EVERY DAY  Patient taking differently: 50 mcg. TAKE 1 TABLET BY MOUTH EVERY DAY 20   Wally Mesa MD   Jardiance 25 mg tablet TAKE 1 TABLET BY MOUTH EVERY DAY 20   Wally Mesa MD   busPIRone (BUSPAR) 5 mg tablet Take 1 Tab by mouth three (3) times daily.  Indications: repeated episodes of anxiety 20   Wally Mesa MD   DULoxetine (CYMBALTA) 20 mg capsule Take 1 Cap by mouth two (2) times a day. Indications: anxiousness associated with depression  Patient taking differently: Take 30 mg by mouth two (2) times a day. Indications: anxiousness associated with depression 12/7/20   Kiah Camilo MD   SITagliptin-metFORMIN (Janumet XR) 50-1,000 mg TM24 TAKE 1 TABLET BY MOUTH TWICE A DAY 12/7/20   Kiah Camilo MD   warfarin (COUMADIN) 2 mg tablet Take 6 mg by mouth daily. 8/14/20   Provider, Historical   sotaloL (BETAPACE) 80 mg tablet Take 80 mg by mouth two (2) times a day. 8/3/20   Provider, Historical   cyclobenzaprine (FLEXERIL) 10 mg tablet Take 10 mg by mouth three (3) times daily. 6/11/20   Provider, Historical   clotrimazole (LOTRIMIN) 1 % topical cream Apply  to affected area two (2) times a day. On FEET 9/1/20   Kiah Camilo MD   betamethasone dipropionate (DIPROSONE) 0.05 % topical cream Apply  to affected area two (2) times daily as needed for Skin Irritation. Of face 9/1/20   Kiah Camilo MD   albuterol (PROVENTIL HFA, VENTOLIN HFA, PROAIR HFA) 90 mcg/actuation inhaler Take 2 Puffs by inhalation every four (4) hours as needed for Wheezing.     Other, MD Nahid     Allergies   Allergen Reactions    Cephalexin Unknown (comments)    Sulfa (Sulfonamide Antibiotics) Unknown (comments)    Zofran [Ondansetron Hcl] Unknown (comments)          Signed By: Lucinda Mccollum MD     June 16, 2022

## 2022-06-16 NOTE — WOUND CARE
06/16/22 1406   Right Leg Edema Point of Measurement   Leg circumference 39.5 cm   Ankle circumference 26 cm   Compression Therapy 2 layer compression wrap   Left Leg Edema Point of Measurement   Leg circumference 41 cm   Ankle circumference 28 cm   Compression Therapy Velcro compression wrap   LLE Peripheral Vascular    Capillary Refill Less than/equal to 3 seconds   Color Appropriate for race   Temperature Warm   Pedal Pulse Palpable   RLE Peripheral Vascular    Capillary Refill Less than/equal to 3 seconds   Color Appropriate for race   Temperature Warm   Pedal Pulse Palpable   Wound Leg lower Left;Lateral #2   Date First Assessed/Time First Assessed: 06/02/22 1120   Present on Hospital Admission: Yes  Location: Leg lower  Wound Location Orientation: Left;Lateral  Wound Description: #2   Wound Image    Dressing Status Clean;Dry; Intact; Other (Comment)  (Bottom of foot macerated)   Cleansed Soap and water   Wound Length (cm) 0.1 cm   Wound Width (cm) 0.1 cm   Wound Depth (cm) 0.1 cm   Wound Surface Area (cm^2) 0.01 cm^2   Change in Wound Size % 98.77   Wound Volume (cm^3) 0.001 cm^3   Wound Healing % 99   Wound Assessment Epithelialization   Drainage Amount None   Wound Odor None   Buffy-Wound/Incision Assessment Blanchable erythema   Edges Attached edges   Wound Leg lower Right;Medial #1   Date First Assessed/Time First Assessed: 05/05/22 1336   Present on Hospital Admission: Yes  Location: Leg lower  Wound Location Orientation: Right;Medial  Wound Description: #1  Wound Outcome: Converged   Wound Image    Dressing Status Old drainage noted   Cleansed Soap and water   Wound Length (cm) 0.3 cm   Wound Width (cm) 0.3 cm   Wound Depth (cm) 0.1 cm   Wound Surface Area (cm^2) 0.09 cm^2   Change in Wound Size % 99.98   Wound Volume (cm^3) 0.009 cm^3   Wound Healing % 100   Wound Assessment Slough;Pink/red   Drainage Amount Small   Drainage Description Serous   Wound Odor None   Buffy-Wound/Incision Assessment Blanchable erythema   Edges Flat/open edges   Wound Thickness Description Partial thickness     Visit Vitals  BP (!) 147/79 (BP 1 Location: Right upper arm, BP Patient Position: Sitting)   Pulse 88   Temp 97 °F (36.1 °C)   Resp 18

## 2022-08-18 ENCOUNTER — HOSPITAL ENCOUNTER (OUTPATIENT)
Dept: WOUND CARE | Age: 67
Discharge: HOME | End: 2022-08-18
Payer: MEDICAID

## 2022-08-18 VITALS
TEMPERATURE: 98 F | SYSTOLIC BLOOD PRESSURE: 181 MMHG | RESPIRATION RATE: 20 BRPM | DIASTOLIC BLOOD PRESSURE: 83 MMHG | HEART RATE: 98 BPM

## 2022-08-18 DIAGNOSIS — L97.222 CALF ULCER, LEFT, WITH FAT LAYER EXPOSED (HCC): Primary | ICD-10-CM

## 2022-08-18 DIAGNOSIS — L97.212 CHRONIC ULCER OF RIGHT CALF WITH FAT LAYER EXPOSED (HCC): ICD-10-CM

## 2022-08-18 PROCEDURE — 97598 DBRDMT OPN WND ADDL 20CM/<: CPT

## 2022-08-18 PROCEDURE — 74011000250 HC RX REV CODE- 250: Performed by: FAMILY MEDICINE

## 2022-08-18 PROCEDURE — 97597 DBRDMT OPN WND 1ST 20 CM/<: CPT

## 2022-08-18 RX ORDER — MUPIROCIN 20 MG/G
OINTMENT TOPICAL ONCE
Status: CANCELLED | OUTPATIENT
Start: 2022-08-18 | End: 2022-08-18

## 2022-08-18 RX ORDER — LIDOCAINE HYDROCHLORIDE 20 MG/ML
JELLY TOPICAL ONCE
Status: CANCELLED | OUTPATIENT
Start: 2022-08-18 | End: 2022-08-18

## 2022-08-18 RX ORDER — CLOBETASOL PROPIONATE 0.5 MG/G
OINTMENT TOPICAL ONCE
Status: CANCELLED | OUTPATIENT
Start: 2022-08-18 | End: 2022-08-18

## 2022-08-18 RX ORDER — LIDOCAINE 40 MG/G
CREAM TOPICAL ONCE
Status: CANCELLED | OUTPATIENT
Start: 2022-08-18 | End: 2022-08-18

## 2022-08-18 RX ORDER — TRIAMCINOLONE ACETONIDE 1 MG/G
OINTMENT TOPICAL ONCE
Status: CANCELLED | OUTPATIENT
Start: 2022-08-18 | End: 2022-08-18

## 2022-08-18 RX ORDER — LIDOCAINE 50 MG/G
OINTMENT TOPICAL ONCE
Status: CANCELLED | OUTPATIENT
Start: 2022-08-18 | End: 2022-08-18

## 2022-08-18 RX ORDER — GENTAMICIN SULFATE 1 MG/G
OINTMENT TOPICAL ONCE
Status: CANCELLED | OUTPATIENT
Start: 2022-08-18 | End: 2022-08-18

## 2022-08-18 RX ORDER — BACITRACIN 500 [USP'U]/G
OINTMENT TOPICAL ONCE
Status: CANCELLED | OUTPATIENT
Start: 2022-08-18 | End: 2022-08-18

## 2022-08-18 RX ORDER — LIDOCAINE HYDROCHLORIDE 40 MG/ML
SOLUTION TOPICAL ONCE
Status: CANCELLED | OUTPATIENT
Start: 2022-08-18 | End: 2022-08-18

## 2022-08-18 RX ADMIN — Medication: at 13:15

## 2022-08-18 NOTE — WOUND CARE
08/18/22 1349   Right Leg Edema Point of Measurement   Compression Therapy 2 layer compression wrap   Left Leg Edema Point of Measurement   Compression Therapy 2 layer compression wrap   Wound Leg lower Distal;Right #1 circumferential   Date First Assessed/Time First Assessed: 08/18/22 1305   Present on Hospital Admission: Yes  Location: Leg lower  Wound Location Orientation: Distal;Right  Wound Description: #1 circumferential   Dressing/Treatment Betadine swabs/Povidone Iodine;Gauze dressing/dressing sponge  (2L wrap)   Wound Leg lower Right;Lateral;Proximal #2   Date First Assessed/Time First Assessed: 08/18/22 1305   Present on Hospital Admission: Yes  Location: Leg lower  Wound Location Orientation: Right;Lateral;Proximal  Wound Description: #2   Dressing/Treatment Betadine swabs/Povidone Iodine;Gauze dressing/dressing sponge  (2L wrap)   Wound Leg lower Left;Lateral #3 circumferential   Date First Assessed/Time First Assessed: 08/18/22 1306   Present on Hospital Admission: Yes  Location: Leg lower  Wound Location Orientation: Left;Lateral  Wound Description: #3 circumferential   Dressing/Treatment Betadine swabs/Povidone Iodine;Gauze dressing/dressing sponge  (2L wrap)   Discharge Condition: Stable     Pain: 0    Ambulatory Status: Walking    Discharge Destination: Home     Transportation: Car    Accompanied by: Self     Discharge instructions reviewed with Patient and copy or written instructions have been provided. All questions/concerns have been addressed at this time.

## 2022-08-18 NOTE — WOUND CARE
08/18/22 1302   Anesthetic   Anesthetic 4% Lidocaine Liquid Topical   Right Leg Edema Point of Measurement   Leg circumference 47 cm   Ankle circumference 28 cm   Left Leg Edema Point of Measurement   Leg circumference 48 cm   Ankle circumference 29 cm   LLE Peripheral Vascular    Capillary Refill Less than/equal to 3 seconds   Color Appropriate for race   Temperature Warm   Pedal Pulse Doppler   RLE Peripheral Vascular    Capillary Refill Less than/equal to 3 seconds   Color Appropriate for race   Temperature Warm   Pedal Pulse Doppler   Wound Leg lower Distal;Right #1 circumferential   Date First Assessed/Time First Assessed: 08/18/22 1305   Present on Hospital Admission: Yes  Location: Leg lower  Wound Location Orientation: Distal;Right  Wound Description: #1 circumferential   Wound Image      Cleansed Soap and water   Wound Length (cm) 9 cm   Wound Width (cm) 24.5 cm   Wound Depth (cm) 0.1 cm   Wound Surface Area (cm^2) 220.5 cm^2   Wound Volume (cm^3) 22.05 cm^3   Wound Assessment [place]/red;Slough   Drainage Amount Moderate   Drainage Description Serosanguinous   Wound Odor None   Buffy-Wound/Incision Assessment Blanchable erythema; Maceration   Edges Flat/open edges   Wound Thickness Description Partial thickness   Wound Leg lower Right;Lateral;Proximal #2   Date First Assessed/Time First Assessed: 08/18/22 1305   Present on Hospital Admission: Yes  Location: Leg lower  Wound Location Orientation: Right;Lateral;Proximal  Wound Description: #2   Wound Image    Cleansed Soap and water   Wound Length (cm) 1.1 cm   Wound Width (cm) 0.5 cm   Wound Depth (cm) 0.1 cm   Wound Surface Area (cm^2) 0.55 cm^2   Wound Volume (cm^3) 0.055 cm^3   Wound Assessment Slough;Pink/red   Drainage Amount Moderate   Drainage Description Serous   Wound Odor None   Buffy-Wound/Incision Assessment Blanchable erythema   Edges Flat/open edges   Wound Thickness Description Partial thickness   Wound Leg lower Left;Lateral #3 circumferential Date First Assessed/Time First Assessed: 08/18/22 1306   Present on Hospital Admission: Yes  Location: Leg lower  Wound Location Orientation: Left;Lateral  Wound Description: #3 circumferential   Wound Image    Cleansed Soap and water   Wound Length (cm) 13 cm  (clustered)   Wound Width (cm) 5 cm   Wound Depth (cm) 0.1 cm   Wound Surface Area (cm^2) 65 cm^2   Wound Volume (cm^3) 6.5 cm^3   Wound Assessment Slough;Pink/red   Drainage Amount Moderate   Drainage Description Serosanguinous   Wound Odor None   Buffy-Wound/Incision Assessment Blanchable erythema; Maceration   Edges Flat/open edges   Wound Thickness Description Partial thickness   Pain 1   Pain Scale 1 Numeric (0 - 10)   Pain Intensity 1 0   Visit Vitals  BP (!) 181/83 (BP 1 Location: Right upper arm, BP Patient Position: Sitting)   Pulse 98   Temp 98 °F (36.7 °C)   Resp 20

## 2022-08-18 NOTE — WOUND CARE
Multilayer Compression Wrap   (Not Unna) Below the Knee    NAME:  Joe Krueger OF BIRTH:  1955  MEDICAL RECORD NUMBER:  [id number]  DATE:  8/18/2022    Applied primary and secondary dressing as ordered. Applied multilayered dressing below the knee to right lower leg. Applied multilayered dressing below the knee to left lower leg. Instructed patient/caregiver not to remove dressing and to keep it clean and dry. Instructed patient/caregiver on complications to report to provider, such as pain, numbness in toes, heavy drainage, and slippage of dressing. Instructed patient on purpose of compression dressing and on activity and exercise recommendations.     Response to treatment: Well tolerated by patient       Electronically signed by Oliverio Hunt LPN on 3/78/0293 at 0:85 PM

## 2022-08-18 NOTE — DISCHARGE INSTRUCTIONS
Discharge Instructions for  [facility]  [address], [address] [facility]  Telephone: [phone]    [address] [place] [address] Information: Should you experience any significant changes in your wound(s) or have questions about your wound care, please contact the [facility] at [address] [place] [address] Street 8:00 am - 4:30. If you need help with your wound outside these hours and cannot wait until we are again available, contact your PCP or go to the hospital emergency room. NAME:  Ephraim Ellsworth  YOB: 1955  DATE:  8/18/2022    : Ceci Gonsalves     [x] Home Healthcare: TO:  [facility]     Wound Cleansing:   Do not scrub or use excessive force. Cleanse wound prior to applying a clean dressing with:  [] Normal Saline   [x] Keep Wound Dry in Shower - may purchase a cast cover at local pharmacy     [x] Cleanse wound with Mild Soap & Water on dressing changes   [] May Shower at Discharge: remove dressing 1st, redress wound right after with a new dressing  [] Do not shower  [] cleanse with baby shampoo lather leave 2-3 then rinse with water    Topical Treatments:  Do not apply lotions, creams, or ointments to wound bed unless directed. [x] Apply moisturizing lotion A&D ointment to skin surrounding the wound prior to dressing change.   [] Other:     Dressings:                   Wound Location Right and Left lower legs     Apply Primary Dressing:      [x] Betadine wet to dry, two layer calamine milikan wrap     Cover and Secure with:  [x] Gauze [] ABD [] exudry     [] Ajay [] Kerlix [] Mepilex Border  [] Ace Wrap [] Roll Tape   [x] Other: two layer calamine milikan wrap     Change dressing:   [] Daily      [] Every Other Day   [] Three times per week  [] Once a week   [] Do Not Change Dressing     [x] Other: 2 X week    Edema Control: Every morning immediately when getting up should be applied to affected leg(s) from mid foot to knee making sure to cover the heel. Remove every night before going to bed if desired. Apply: [] Compression Stocking      []Right Leg           []Left Leg   [] Tubigrip {tubigrip:80602}   [] Right Leg Single Layer  [] Right Leg Double Layer                              [] Left Leg Single Layer [] Left Leg Double Layer      Compression: Do not get leg(s) with wrap wet. If wraps become too tight call the center or completely remove the wrap. Apply: [] Three Layer Compression Wrap  []RightLeg []Left Leg  [] Four layer Compression Wrap      []RightLeg []Left Leg   [x] Two Layer calamine wrap                                 [x] Right Leg   [x] Left Leg       [x] Elevate leg(s) above the level of the heart when sitting. [x] Avoid prolonged standing in one place. Dietary:  [x] Diet as tolerated [] Diabetic Diet   [x] Increase Protein: examples (Meat, cheese, eggs, greek yogurt, fish, nuts)   [] Catrachito Therapeutic Nutrition Powder  [] Other:  [] Dial a Dietician : Call [employer] at [phone] enter code ([phone]) when prompted. M-F 9am-5pm EST. Return Appointment:  [] Nurse Visit at wound center in *** days   [x] Return Appointment: With Dr. Corrinne Kin in 1 week(s)  [] Ordered tests:     Electronically signed on 8/18/2022 at 8:17 AM     PLEASE NOTE: IF YOU ARE UNABLE TO [address], CONTINUE TO USE THE SUPPLIES YOU HAVE AVAILABLE UNTIL YOU ARE ABLE TO [facility]. IT IS MOST IMPORTANT TO KEEP THE WOUND COVERED AT ALL TIMES.      Physician Signature:_______________________  Dr. Corrinne Kin

## 2022-08-18 NOTE — PROGRESS NOTES
Wound Center  Progress Note / Procedure Note    Subjective:     Chief Complaint:  Cedric Astorga is a 73 y.o.  female  with leg wounds of >few months duration. HPI:     Legs swelling with wounds  Known to us. Last seen here 6/16  Per patient fell 6-8 weeks ago  And then got a cold- wasn't doing anything to leg  No compression  Legs got ++ swollen- making farrows too tight      History/Chart/Medications reviewed        Wound caused by: swelling  Current wound care:See flowsheet  Offloading wound: yes  Appetite: good  Wound associated pain: See flowsheet  Diabetic: yes  Smoker: no  ROS: no N/V/D, no T/chills; no local rash, no chest pain or shortness of breath, no headache or dizzyness      Objective:     Physical Exam:   See flowsheet / nursing notes for vitals  Visit Vitals  BP (!) 181/83 (BP 1 Location: Right upper arm, BP Patient Position: Sitting)   Pulse 98   Temp 98 °F (36.7 °C)   Resp 20     General: NAD. Hygiene good  Psych: cooperative. No anxiety or depression. Normal mood and affect. Neuro: alert and oriented to person/place/situation. Otherwise nonfocal.  Derm: Normal  turgor for age, dry skin  HEENT: Normocephalic, atraumatic. EOMI. Conjunctiva clear. No scleral icterus. Neck: Normal range of motion. No masses. Chest: Respirations nonlabored  Lower extremities: color normal; temperature normal. Hair growth is not present. Calves are supple, nontender, approximately equally sized in comparison. Capillary refill <3 sec  Focussed Lower Extremity Exam:  Vascular exam:  Right lower extremity: moderate  edema, foot warm,   DP pulse : 1+  PT pulse: 0  Nails dystrophic   Left lower extremity: moderate  edema, foot warm,   DP pulse : 1+  PT pulse: 0   Nails dystrophic    Ulcer Description:   See Flowsheet         Data Review:              Assessment/Plan     73 y.o. female with diabetes, chronic leg edema    -R/ leg ulcer.   Partial thickness- multiples areas  Clustered    Slough  Necessitates debridement  for wound healing and to prevent/heal infection  See below        -L/ leg ulcer. Partial thickness  Clustered    Slough  Necessitates debridement  for wound healing and to prevent/heal infection  See below            - leg edema      Venous insuff      Dm2  Poor control per patient      Following discussed with patient   Needs :  Serial debridement- debrided today- see note below    Good local wound care  R/ leg  Dressing:  betadine wet to dry  Frequency : 2x/week  Order Home health      -Edema management  Remove dressing prior to showering  Do not get dressing wet    Edema management:  Elevate leg(s) throughout the day starting in the morning  Compression: unna lite 2 layer  Avoid prolonged standing      -Good Diabetic control    -Good offloading    Patient/ understood and agrees with plan. Questions answered. Serial visits and serial debridements - continue    Follow up with me in 1 week        Procedure:     Ulcer assessment: Due to presence of necrotic tissue within the wound bed, ulcer requires debridement. Procedure: Debridement:   The indication for debridement was reviewed with patient. Risks of procedure (bleeding, infection, pain) were discussed with patient and consent signed on first visit. Questions were answered      Selectivedebridement Right leg ulcer and left leg ulcers- #1, 2, 3  Indication: to remove necrotic tissue/ vitalized and devitalized tissue/ infected tissue through skin  layer of wound bed  Time out done  Consent in chart   Anesthesia: Topical  lidocaine   Instrument:  curette  Residual Necrosis: Present and scored   Bleeding: <1ml   Hemostasis: Pressure   Patient tolerated procedure well   Procedural Pain: 0  Post - procedural pain: 0    Post debridement measurements: see below  Surface area debrided: <40 sq.  Cm   Wound Leg lower Distal;Right #1 circumferential (Active)   Wound Image     08/18/22 1302   Wound Etiology Venous 08/18/22 1333   Cleansed Soap and water 08/18/22 1302   Wound Length (cm) 9 cm 08/18/22 1302   Wound Width (cm) 24.5 cm 08/18/22 1302   Wound Depth (cm) 0.1 cm 08/18/22 1302   Wound Surface Area (cm^2) 220.5 cm^2 08/18/22 1302   Wound Volume (cm^3) 22.05 cm^3 08/18/22 1302   Post-Procedure Length (cm) 9 cm 08/18/22 1334   Post-Procedure Width (cm) 24.5 cm 08/18/22 1334   Post-Procedure Depth (cm) 0.2 cm 08/18/22 1334   Post-Procedure Surface Area (cm^2) 220.5 cm^2 08/18/22 1334   Post-Procedure Volume (cm^3) 44.1 cm^3 08/18/22 1334   Wound Assessment [place]/red;Slough 08/18/22 1302   Drainage Amount Moderate 08/18/22 1302   Drainage Description Serosanguinous 08/18/22 1302   Wound Odor None 08/18/22 1302   Buffy-Wound/Incision Assessment Blanchable erythema; Maceration 08/18/22 1302   Edges Flat/open edges 08/18/22 1302   Wound Thickness Description Partial thickness 08/18/22 1302   Number of days: 0       Wound Leg lower Right;Lateral;Proximal #2 (Active)   Wound Image   08/18/22 1302   Wound Etiology Venous 08/18/22 1333   Cleansed Soap and water 08/18/22 1302   Wound Length (cm) 1.1 cm 08/18/22 1302   Wound Width (cm) 0.5 cm 08/18/22 1302   Wound Depth (cm) 0.1 cm 08/18/22 1302   Wound Surface Area (cm^2) 0.55 cm^2 08/18/22 1302   Wound Volume (cm^3) 0.055 cm^3 08/18/22 1302   Post-Procedure Length (cm) 1.1 cm 08/18/22 1334   Post-Procedure Width (cm) 0.5 cm 08/18/22 1334   Post-Procedure Depth (cm) 0.2 cm 08/18/22 1334   Post-Procedure Surface Area (cm^2) 0.55 cm^2 08/18/22 1334   Post-Procedure Volume (cm^3) 0.11 cm^3 08/18/22 1334   Wound Assessment Slough;[place]/red 08/18/22 1302   Drainage Amount Moderate 08/18/22 1302   Drainage Description Serous 08/18/22 1302   Wound Odor None 08/18/22 1302   Buffy-Wound/Incision Assessment Blanchable erythema 08/18/22 1302   Edges Flat/open edges 08/18/22 1302   Wound Thickness Description Partial thickness 08/18/22 1302   Number of days: 0       Wound Leg lower Left;Lateral #3 circumferential (Active)   Wound Image   22 1302   Wound Etiology Venous 22 1333   Cleansed Soap and water 22 1302   Wound Length (cm) 13 cm 22 1302   Wound Width (cm) 5 cm 22 1302   Wound Depth (cm) 0.1 cm 22 1302   Wound Surface Area (cm^2) 65 cm^2 22 1302   Wound Volume (cm^3) 6.5 cm^3 22 1302   Post-Procedure Length (cm) 13 cm 22 1334   Post-Procedure Width (cm) 5 cm 22 1334   Post-Procedure Depth (cm) 0.2 cm 22 1334   Post-Procedure Surface Area (cm^2) 65 cm^2 22 1334   Post-Procedure Volume (cm^3) 13 cm^3 22 1334   Wound Assessment Slough;[place]/red 22 1302   Drainage Amount Moderate 22 1302   Drainage Description Serosanguinous 22 1302   Wound Odor None 22 1302   Buffy-Wound/Incision Assessment Blanchable erythema; Maceration 22 1302   Edges Flat/open edges 22 1302   Wound Thickness Description Partial thickness 22 1302   Number of days: 0          -------    Past Medical History:   Diagnosis Date    Allergies     Asthma     Cardiac arrhythmia     Diabetes ([address]     Hx of long term use of blood thinners     Hypertension     Thyroid disease       Past Surgical History:   Procedure Laterality Date    HX  SECTION      HX GYN       Family History   Problem Relation Age of Onset    Hypertension Other     Diabetes Other     Heart Disease Other       Social History     Tobacco Use    Smoking status: Never    Smokeless tobacco: Never   Substance Use Topics    Alcohol use: No       Prior to Admission medications    Medication Sig Start Date End Date Taking? Authorizing Provider   gabapentin (NEURONTIN) 300 mg capsule Take 300 mg by mouth nightly. Take 1 capsule by mouth every day at bedtime, and 1 capsule during the day as needed   Yes Provider, Historical   amLODIPine-benazepril (LOTREL) 5-10 mg per capsule Take 1 Capsule by mouth daily.    Yes Provider, Historical   amLODIPine (NORVASC) 5 mg tablet Take 5 mg by mouth daily. Yes Provider, Historical   oxybutynin (DITROPAN) 5 mg tablet Take 5 mg by mouth two (2) times a day. Yes Provider, Historical   loratadine (CLARITIN) 10 mg tablet TAKE 1 TABLET BY MOUTH EVERY DAY 12/7/20  Yes Deanna Rene MD   levothyroxine (SYNTHROID) 25 mcg tablet TAKE 1 TABLET BY MOUTH EVERY DAY  Patient taking differently: 50 mcg. TAKE 1 TABLET BY MOUTH EVERY DAY 12/7/20  Yes Deanna Rene MD   Jardiance 25 mg tablet TAKE 1 TABLET BY MOUTH EVERY DAY 12/7/20  Yes Deanna Rene MD   busPIRone (BUSPAR) 5 mg tablet Take 1 Tab by mouth three (3) times daily. Indications: repeated episodes of anxiety 12/7/20  Yes Deanna Rene MD   DULoxetine (CYMBALTA) 20 mg capsule Take 1 Cap by mouth two (2) times a day. Indications: anxiousness associated with depression  Patient taking differently: Take 30 mg by mouth two (2) times a day. Indications: anxiousness associated with depression 12/7/20  Yes Deanna Rene MD   SITagliptin-metFORMIN (Janumet XR) 50-1,000 mg TM24 TAKE 1 TABLET BY MOUTH TWICE A DAY 12/7/20  Yes Deanna Rene MD   warfarin (COUMADIN) 2 mg tablet Take 6 mg by mouth daily. 8/14/20  Yes Provider, Historical   sotaloL (BETAPACE) 80 mg tablet Take 80 mg by mouth two (2) times a day. 8/3/20  Yes Provider, Historical   cyclobenzaprine (FLEXERIL) 10 mg tablet Take 10 mg by mouth three (3) times daily. 6/11/20  Yes Provider, Historical   fluocinoNIDE (LIDEX) 0.05 % ointment Apply  to affected area two (2) times a day. 1/22/21   Deanna Rene MD   clotrimazole (LOTRIMIN) 1 % topical cream Apply  to affected area two (2) times a day. On FEET 9/1/20   Deanna Rene MD   betamethasone dipropionate (DIPROSONE) 0.05 % topical cream Apply  to affected area two (2) times daily as needed for Skin Irritation.  Of face 9/1/20   Deanna Rene MD   albuterol (PROVENTIL HFA, VENTOLIN HFA, PROAIR HFA) 90 mcg/actuation inhaler Take 2 Puffs by inhalation every four (4) hours as needed for Wheezing.     Other, MD Nahid     Allergies   Allergen Reactions    Cephalexin Unknown (comments)    Sulfa (Sulfonamide Antibiotics) Unknown (comments)    Zofran [Ondansetron Hcl] Unknown (comments)          Signed By: Nasreen Sterling MD     August 18, 2022

## 2022-08-25 ENCOUNTER — HOSPITAL ENCOUNTER (OUTPATIENT)
Dept: WOUND CARE | Age: 67
Discharge: HOME | End: 2022-08-25
Payer: MEDICAID

## 2022-08-25 VITALS — SYSTOLIC BLOOD PRESSURE: 182 MMHG | HEART RATE: 88 BPM | DIASTOLIC BLOOD PRESSURE: 82 MMHG | TEMPERATURE: 98.2 F

## 2022-08-25 DIAGNOSIS — L97.222 CALF ULCER, LEFT, WITH FAT LAYER EXPOSED (HCC): Primary | ICD-10-CM

## 2022-08-25 DIAGNOSIS — L97.212 CHRONIC ULCER OF RIGHT CALF WITH FAT LAYER EXPOSED (HCC): ICD-10-CM

## 2022-08-25 PROCEDURE — 11042 DBRDMT SUBQ TIS 1ST 20SQCM/<: CPT

## 2022-08-25 PROCEDURE — 97597 DBRDMT OPN WND 1ST 20 CM/<: CPT

## 2022-08-25 PROCEDURE — 74011000250 HC RX REV CODE- 250: Performed by: FAMILY MEDICINE

## 2022-08-25 RX ORDER — LIDOCAINE HYDROCHLORIDE 20 MG/ML
JELLY TOPICAL ONCE
Status: CANCELLED | OUTPATIENT
Start: 2022-08-25 | End: 2022-08-25

## 2022-08-25 RX ORDER — LIDOCAINE 40 MG/G
CREAM TOPICAL ONCE
Status: CANCELLED | OUTPATIENT
Start: 2022-08-25 | End: 2022-08-25

## 2022-08-25 RX ORDER — GENTAMICIN SULFATE 1 MG/G
OINTMENT TOPICAL ONCE
Status: CANCELLED | OUTPATIENT
Start: 2022-08-25 | End: 2022-08-25

## 2022-08-25 RX ORDER — LIDOCAINE 50 MG/G
OINTMENT TOPICAL ONCE
Status: CANCELLED | OUTPATIENT
Start: 2022-08-25 | End: 2022-08-25

## 2022-08-25 RX ORDER — TRIAMCINOLONE ACETONIDE 1 MG/G
OINTMENT TOPICAL ONCE
Status: CANCELLED | OUTPATIENT
Start: 2022-08-25 | End: 2022-08-25

## 2022-08-25 RX ORDER — LIDOCAINE HYDROCHLORIDE 40 MG/ML
SOLUTION TOPICAL ONCE
Status: CANCELLED | OUTPATIENT
Start: 2022-08-25 | End: 2022-08-25

## 2022-08-25 RX ORDER — BACITRACIN 500 [USP'U]/G
OINTMENT TOPICAL ONCE
Status: CANCELLED | OUTPATIENT
Start: 2022-08-25 | End: 2022-08-25

## 2022-08-25 RX ORDER — CLOBETASOL PROPIONATE 0.5 MG/G
OINTMENT TOPICAL ONCE
Status: CANCELLED | OUTPATIENT
Start: 2022-08-25 | End: 2022-08-25

## 2022-08-25 RX ORDER — MUPIROCIN 20 MG/G
OINTMENT TOPICAL ONCE
Status: CANCELLED | OUTPATIENT
Start: 2022-08-25 | End: 2022-08-25

## 2022-08-25 RX ADMIN — Medication: at 13:21

## 2022-08-25 NOTE — WOUND CARE
08/25/22 1306   Anesthetic   Anesthetic 4% Lidocaine Liquid Topical   Right Leg Edema Point of Measurement   Leg circumference 44.5 cm   Ankle circumference 27 cm   Left Leg Edema Point of Measurement   Leg circumference 47.8 cm   Ankle circumference 27.4 cm   LLE Peripheral Vascular    Capillary Refill Less than/equal to 3 seconds   Color Appropriate for race   Temperature Cool   Pedal Pulse Palpable   RLE Peripheral Vascular    Capillary Refill Less than/equal to 3 seconds   Color Appropriate for race   Temperature Cool   Pedal Pulse Palpable   Wound Leg lower Left;Lateral #3 Cluster   Date First Assessed/Time First Assessed: 08/18/22 1306   Present on Hospital Admission: Yes  Location: Leg lower  Wound Location Orientation: Left;Lateral  Wound Description: #3 Cluster   Wound Image    Wound Etiology Venous   Dressing Status Old drainage noted   Cleansed Soap and water   Wound Length (cm) 7 cm   Wound Width (cm) 4.2 cm   Wound Depth (cm) 0.1 cm   Wound Surface Area (cm^2) 29.4 cm^2   Change in Wound Size % 54.77   Wound Volume (cm^3) 2.94 cm^3   Wound Healing % 55   Wound Assessment Slough;Pink/red  (Dried Exudate)   Drainage Amount Moderate   Drainage Description Serosanguinous   Wound Odor None   Buffy-Wound/Incision Assessment Blanchable erythema; Maceration   Edges Flat/open edges   Wound Thickness Description Partial thickness   Wound Leg lower Distal;Right #1   Date First Assessed/Time First Assessed: 08/18/22 1305   Present on Hospital Admission: Yes  Location: Leg lower  Wound Location Orientation: Distal;Right  Wound Description: #1   Wound Image    Wound Etiology Venous   Dressing Status Old drainage noted   Cleansed Soap and water   Wound Length (cm) 0.8 cm   Wound Width (cm) 0.4 cm   Wound Depth (cm) 0.1 cm   Wound Surface Area (cm^2) 0.32 cm^2   Change in Wound Size % 99.85   Wound Volume (cm^3) 0.032 cm^3   Wound Healing % 100   Wound Assessment Pink/red;Slough  (dried exudate)   Drainage Amount Moderate   Drainage Description Serosanguinous   Wound Odor None   Buffy-Wound/Incision Assessment Blanchable erythema; Maceration   Edges Flat/open edges   Wound Thickness Description Partial thickness   Wound Leg lower Right;Lateral;Proximal #2   Date First Assessed/Time First Assessed: 08/18/22 1305   Present on Hospital Admission: Yes  Location: Leg lower  Wound Location Orientation: Right;Lateral;Proximal  Wound Description: #2   Wound Image    Wound Etiology Venous   Dressing Status Old drainage noted   Cleansed Soap and water   Wound Length (cm) 1.5 cm   Wound Width (cm) 0.8 cm   Wound Depth (cm) 0.1 cm   Wound Surface Area (cm^2) 1.2 cm^2   Change in Wound Size % -118.18   Wound Volume (cm^3) 0.12 cm^3   Wound Healing % -118   Wound Assessment Slough;Pink/red   Drainage Amount Moderate   Drainage Description Serosanguinous   Wound Odor None   Buffy-Wound/Incision Assessment Blanchable erythema; Maceration   Edges Flat/open edges   Wound Thickness Description Partial thickness   Wound Leg lower Medial;Right #4   Date First Assessed/Time First Assessed: 08/25/22 1315   Present on Hospital Admission: Yes  Location: Leg lower  Wound Location Orientation: Medial;Right  Wound Description: #4   Wound Image    Dressing Status Old drainage noted   Cleansed Soap and water   Wound Length (cm) 2 cm   Wound Width (cm) 1.6 cm   Wound Depth (cm) 0.2 cm   Wound Surface Area (cm^2) 3.2 cm^2   Wound Volume (cm^3) 0.64 cm^3   Wound Assessment Pink/red;Slough  (Dried Exudate)   Drainage Amount Moderate   Drainage Description Serosanguinous   Buffy-Wound/Incision Assessment Maceration;Blanchable erythema   Edges Flat/open edges   Pain 1   Pain Scale 1 Numeric (0 - 10)   Pain Intensity 1 0   Visit Vitals  BP (!) 182/82 (BP 1 Location: Right arm, BP Patient Position: At rest)   Pulse 88   Temp 98.2 °F (36.8 °C)

## 2022-08-25 NOTE — DISCHARGE INSTRUCTIONS
Discharge Instructions for  [facility]  [address] [place], [address] [facility]  Telephone: [phone] 215 [facility] Information: Should you experience any significant changes in your wound(s) or have questions about your wound care, please contact the [facility] [place] [facility] at [address] [place] [address] Street 8:00 am - 4:30. If you need help with your wound outside these hours and cannot wait until we are again available, contact your PCP or go to the hospital emergency room. NAME:  Hank Palacio  YOB: 1955  DATE:  8/18/2022    : Manuel Liz     [] Home Healthcare: TO:      Wound Cleansing:   Do not scrub or use excessive force. Cleanse wound prior to applying a clean dressing with:  [] Normal Saline   [x] Keep Wound Dry in Shower - may purchase a cast cover at local pharmacy     [x] Cleanse wound with Mild Soap & Water on dressing changes   [] May Shower at Discharge: remove dressing 1st, redress wound right after with a new dressing  [] Do not shower  [] cleanse with baby shampoo lather leave 2-3 then rinse with water    Topical Treatments:  Do not apply lotions, creams, or ointments to wound bed unless directed. [x] Apply moisturizing lotion A&D ointment to skin surrounding the wound prior to dressing change.   [] Other:     Dressings:                   Wound Location Right and Left lower legs     Apply Primary Dressing:      [x] Mesalt and cover with  two layer calamine milikan wrap     Cover and Secure with:  [x] Gauze [] ABD [] exudry     [] Ajay [] Kerlix [] Mepilex Border  [] Ace Wrap [] Roll Tape   [x] Other: two layer calamine milikan wrap     Change dressing:   [] Daily      [] Every Other Day   [] Three times per week  [x] Once a week at wound center    [] Do Not Change Dressing     [] Other: 2 X week    Edema Control: Every morning immediately when getting up should be applied to affected leg(s) from mid foot to knee making sure to cover the heel. Remove every night before going to bed if desired. Apply: [] Compression Stocking      []Right Leg           []Left Leg   [] Tubigrip   [] Right Leg Single Layer  [] Right Leg Double Layer                              [] Left Leg Single Layer [] Left Leg Double Layer      Compression: Do not get leg(s) with wrap wet. If wraps become too tight call the center or completely remove the wrap. Apply: [] Three Layer Compression Wrap  []RightLeg []Left Leg  [] Four layer Compression Wrap      []RightLeg []Left Leg   [x] Two Layer calamine wrap                                 [x] Right Leg   [x] Left Leg       [x] Elevate leg(s) above the level of the heart when sitting. [x] Avoid prolonged standing in one place. Dietary:  [x] Diet as tolerated [] Diabetic Diet   [x] Increase Protein: examples (Meat, cheese, eggs, greek yogurt, fish, nuts)   [] Catrachito Therapeutic Nutrition Powder  [] Other:  [] Dial a Dietician : Call [employer] at [phone] enter code [phone]) when prompted. M-F 9am-5pm EST. Return Appointment:    [x] Return Appointment: With Dr. Lino Hernández in 1 week(s)  [] Ordered tests:         PLEASE NOTE: IF YOU ARE UNABLE TO OBTAIN WOUND SUPPLIES, CONTINUE TO USE THE SUPPLIES YOU HAVE AVAILABLE UNTIL YOU ARE ABLE TO REACH US. IT IS MOST IMPORTANT TO KEEP THE WOUND COVERED AT ALL TIMES.      Physician Signature:_______________________  Dr. Lino Hernández

## 2022-08-25 NOTE — WOUND CARE
08/25/22 1350   Right Leg Edema Point of Measurement   Compression Therapy 2 layer compression wrap   Left Leg Edema Point of Measurement   Compression Therapy 2 layer compression wrap   Wound Leg lower Left;Lateral #3 Cluster   Date First Assessed/Time First Assessed: 08/18/22 1306   Present on Hospital Admission: Yes  Location: Leg lower  Wound Location Orientation: Left;Lateral  Wound Description: #3 Cluster   Dressing/Treatment   (mesalt, gauze 2L wrap)   Wound Leg lower Distal;Right #1   Date First Assessed/Time First Assessed: 08/18/22 1305   Present on Hospital Admission: Yes  Location: Leg lower  Wound Location Orientation: Distal;Right  Wound Description: #1   Dressing/Treatment   (mesalt, gauze 2L wrap)   Wound Leg lower Right;Lateral;Proximal #2   Date First Assessed/Time First Assessed: 08/18/22 1305   Present on Hospital Admission: Yes  Location: Leg lower  Wound Location Orientation: Right;Lateral;Proximal  Wound Description: #2   Dressing/Treatment   (mesalt, gauze 2L wrap)   Wound Leg lower Medial;Right #4   Date First Assessed/Time First Assessed: 08/25/22 1315   Present on Hospital Admission: Yes  Location: Leg lower  Wound Location Orientation: Medial;Right  Wound Description: #4   Dressing/Treatment   (mesalt, gauze 2L wrap)   Multilayer Compression Wrap   (Not Unna) Below the Knee    NAME:  Ellen Hobson OF BIRTH:  1955  MEDICAL RECORD NUMBER:  [id number]  DATE:  8/25/2022    Removed old Multilayer wrap if indicated and wash leg with mild soap/water. Applied moisturizing agent to dry skin as needed. Applied primary and secondary dressing as ordered. Applied multilayered dressing below the knee to right lower leg. Applied multilayered dressing below the knee to left lower leg. Instructed patient/caregiver not to remove dressing and to keep it clean and dry.    Instructed patient/caregiver on complications to report to provider, such as pain, numbness in toes, heavy drainage, and slippage of dressing. Instructed patient on purpose of compression dressing and on activity and exercise recommendations. Response to treatment: Well tolerated by patient Discharge Condition: Stable     Pain: 0    Ambulatory Status: Walking    Discharge Destination: Home     Transportation: Car    Accompanied by: Self     Discharge instructions reviewed with Patient and copy or written instructions have been provided. All questions/concerns have been addressed at this time.                   Electronically signed by Paolo Lopez LPN on 7/54/6306 at 4:10 PM

## 2022-08-25 NOTE — PROGRESS NOTES
Wound Center  Progress Note / Procedure Note    Subjective:     Chief Complaint:  Jimmy Ledbetter is a 73 y.o.  female  with leg wounds of >few months duration. HPI:     Legs swelling with wounds  Known to us. Last seen here 6/16  Per patient fell 6-8 weeks ago  And then got a cold- wasn't doing anything to leg  No compression  Legs got ++ swollen- making farrows too tight      History/Chart/Medications reviewed    Since last visit:  No new issues  Wrap stayed on  [place] didn't call or visit      Wound caused by: swelling  Current wound care:See flowsheet  Offloading wound: yes  Appetite: good  Wound associated pain: See flowsheet  Diabetic: yes  Smoker: no  ROS: no N/V/D, no T/chills; no local rash, no chest pain or shortness of breath, no headache or dizzyness      Objective:     Physical Exam:   See flowsheet / nursing notes for vitals  Visit Vitals  BP (!) 182/82 (BP 1 Location: Right arm, BP Patient Position: At rest)   Pulse 88   Temp 98.2 °F (36.8 °C)     General: NAD. Hygiene good  Psych: cooperative. No anxiety or depression. Normal mood and affect. Neuro: alert and oriented to person/place/situation. Otherwise nonfocal.  Derm: Normal  turgor for age, dry skin  HEENT: Normocephalic, atraumatic. EOMI. Conjunctiva clear. No scleral icterus. Neck: Normal range of motion. No masses. Chest: Respirations nonlabored  Lower extremities: color normal; temperature normal. Hair growth is not present. Calves are supple, nontender, approximately equally sized in comparison.  Capillary refill <3 sec  Focussed Lower Extremity Exam:  Vascular exam:  Right lower extremity: moderate  edema, foot warm,   DP pulse : 1+  PT pulse: 0  Nails dystrophic   Left lower extremity: moderate  edema, foot warm,   DP pulse : 1+  PT pulse: 0   Nails dystrophic    Ulcer Description:   See Flowsheet         Data Review:              Assessment/Plan     73 y.o. female with diabetes, chronic leg edema    -R/ leg ulcer x 3  multiples areas  Partial thickness- x 1   Full thickness x 2   Slough  Necessitates debridement  for wound healing and to prevent/heal infection  See below    -L/ leg ulcer x 1  Full thickness  Clustered  Slough  Necessitates debridement  for wound healing and to prevent/heal infection  See below        - leg edema      Venous insuff      Dm2  Poor control per patient      Following discussed with patient   Needs :  Serial debridement- debrided today- see note below    Good local wound care  R/ leg  Dressing:  D/C betadine wet to dry. START mesalt  Frequency : 1x/week  Hold off on Home health for now, will change once weekly here      -Edema management  Remove dressing prior to showering  Do not get dressing wet    Edema management:  Elevate leg(s) throughout the day starting in the morning  Compression: unna lite 2 layer  Avoid prolonged standing      -Good Diabetic control    -Good offloading    Patient/ understood and agrees with plan. Questions answered. Serial visits and serial debridements - continue    Follow up with me in 1 week        Procedure:     Ulcer assessment: Due to presence of necrotic tissue within the wound bed, ulcer requires debridement. Procedure: Debridement:   The indication for debridement was reviewed with patient. Risks of procedure (bleeding, infection, pain) were discussed with patient and consent signed on first visit. Questions were answered      Selective debridement Right leg ulcer - #2,   Indication: to remove necrotic tissue/ vitalized and devitalized tissue/ infected tissue through skin  layer of wound bed  Time out done  Consent in chart   Anesthesia: Topical  lidocaine   Instrument:  curette  Residual Necrosis: Present and scored   Bleeding: <1ml   Hemostasis: Pressure   Patient tolerated procedure well   Procedural Pain: 0  Post - procedural pain: 0    Post debridement measurements: see below  Surface area debrided: <20 sq.  Cm     Subcutaneous excisional debridement Right leg ulcer and left leg ulcers- #1,3, 4  Indication: to remove necrotic tissue/ vitalized and devitalized tissue/ infected tissue through skin  and subcutaneous layer of wound bed  Time out done  Consent in chart   Anesthesia: Topical  lidocaine   Instrument:  curette  Residual Necrosis: Present and scored   Bleeding: <1ml   Hemostasis: Pressure   Patient tolerated procedure well   Procedural Pain: 0  Post - procedural pain: 0    Post debridement measurements: see below  Surface area debrided: <20sq. Cm     Wound Leg lower Distal;Right #1 (Active)   Wound Image   08/25/22 1306   Wound Etiology Venous 08/25/22 1306   Dressing Status Old drainage noted 08/25/22 1306   Cleansed Soap and water 08/25/22 1306   Dressing/Treatment Betadine swabs/Povidone Iodine;Gauze dressing/dressing sponge 08/18/22 1349   Wound Length (cm) 0.8 cm 08/25/22 1306   Wound Width (cm) 0.4 cm 08/25/22 1306   Wound Depth (cm) 0.1 cm 08/25/22 1306   Wound Surface Area (cm^2) 0.32 cm^2 08/25/22 1306   Change in Wound Size % 99.85 08/25/22 1306   Wound Volume (cm^3) 0.032 cm^3 08/25/22 1306   Wound Healing % 100 08/25/22 1306   Post-Procedure Length (cm) 0.8 cm 08/25/22 1336   Post-Procedure Width (cm) 0.4 cm 08/25/22 1336   Post-Procedure Depth (cm) 0.2 cm 08/25/22 1336   Post-Procedure Surface Area (cm^2) 0.32 cm^2 08/25/22 1336   Post-Procedure Volume (cm^3) 0.064 cm^3 08/25/22 1336   Wound Assessment [place]/red;Slough 08/25/22 1306   Drainage Amount Moderate 08/25/22 1306   Drainage Description Serosanguinous 08/25/22 1306   Wound Odor None 08/25/22 1306   Buffy-Wound/Incision Assessment Blanchable erythema; Maceration 08/25/22 1306   Edges Flat/open edges 08/25/22 1306   Wound Thickness Description Partial thickness 08/25/22 1306   Number of days: 7       Wound Leg lower Right;Lateral;Proximal #2 (Active)   Wound Image   08/25/22 1306   Wound Etiology Venous 08/25/22 1306   Dressing Status Old drainage noted 08/25/22 1306   Cleansed Soap and water 08/25/22 1306   Dressing/Treatment Betadine swabs/Povidone Iodine;Gauze dressing/dressing sponge 08/18/22 1349   Wound Length (cm) 1.5 cm 08/25/22 1306   Wound Width (cm) 0.8 cm 08/25/22 1306   Wound Depth (cm) 0.1 cm 08/25/22 1306   Wound Surface Area (cm^2) 1.2 cm^2 08/25/22 1306   Change in Wound Size % -118.18 08/25/22 1306   Wound Volume (cm^3) 0.12 cm^3 08/25/22 1306   Wound Healing % -118 08/25/22 1306   Post-Procedure Length (cm) 1.5 cm 08/25/22 1336   Post-Procedure Width (cm) 0.8 cm 08/25/22 1336   Post-Procedure Depth (cm) 0.2 cm 08/25/22 1336   Post-Procedure Surface Area (cm^2) 1.2 cm^2 08/25/22 1336   Post-Procedure Volume (cm^3) 0.24 cm^3 08/25/22 1336   Wound Assessment Slough;Pink/red 08/25/22 1306   Drainage Amount Moderate 08/25/22 1306   Drainage Description Serosanguinous 08/25/22 1306   Wound Odor None 08/25/22 1306   Buffy-Wound/Incision Assessment Blanchable erythema; Maceration 08/25/22 1306   Edges Flat/open edges 08/25/22 1306   Wound Thickness Description Partial thickness 08/25/22 1306   Number of days: 7       Wound Leg lower Left;Lateral #3 Cluster (Active)   Wound Image   08/25/22 1306   Wound Etiology Venous 08/25/22 1306   Dressing Status Old drainage noted 08/25/22 1306   Cleansed Soap and water 08/25/22 1306   Dressing/Treatment Betadine swabs/Povidone Iodine;Gauze dressing/dressing sponge 08/18/22 1349   Wound Length (cm) 7 cm 08/25/22 1306   Wound Width (cm) 4.2 cm 08/25/22 1306   Wound Depth (cm) 0.1 cm 08/25/22 1306   Wound Surface Area (cm^2) 29.4 cm^2 08/25/22 1306   Change in Wound Size % 54.77 08/25/22 1306   Wound Volume (cm^3) 2.94 cm^3 08/25/22 1306   Wound Healing % 55 08/25/22 1306   Post-Procedure Length (cm) 7 cm 08/25/22 1336   Post-Procedure Width (cm) 4.2 cm 08/25/22 1336   Post-Procedure Depth (cm) 0.1 cm 08/25/22 1336   Post-Procedure Surface Area (cm^2) 29.4 cm^2 08/25/22 1336   Post-Procedure Volume (cm^3) 2.94 cm^3 08/25/22 1336   Wound Assessment Slough;Pink/red 22 1306   Drainage Amount Moderate 22 1306   Drainage Description Serosanguinous 22 1306   Wound Odor None 22 130   Buffy-Wound/Incision Assessment Blanchable erythema; Maceration 22 1306   Edges Flat/open edges 22 1306   Wound Thickness Description Partial thickness 22 1306   Number of days: 7       Wound Leg lower Medial;Right #4 (Active)   Wound Image   22 1306   Dressing Status Old drainage noted 22 1306   Cleansed Soap and water 22 1306   Wound Length (cm) 2 cm 22 1306   Wound Width (cm) 1.6 cm 22 1306   Wound Depth (cm) 0.2 cm 22 1306   Wound Surface Area (cm^2) 3.2 cm^2 22 1306   Wound Volume (cm^3) 0.64 cm^3 22 1306   Post-Procedure Length (cm) 2 cm 22 1336   Post-Procedure Width (cm) 1.6 cm 22 1336   Post-Procedure Depth (cm) 0.3 cm 22 1336   Post-Procedure Surface Area (cm^2) 3.2 cm^2 22 1336   Post-Procedure Volume (cm^3) 0.96 cm^3 22 1336   Wound Assessment [place]/red;Slough 22 1306   Drainage Amount Moderate 22 1306   Drainage Description Serosanguinous 22 1306   Buffy-Wound/Incision Assessment Maceration;Blanchable erythema 22 1306   Edges Flat/open edges 22 1306   Number of days: 0        -------    Past Medical History:   Diagnosis Date    Allergies     Asthma     Cardiac arrhythmia     Diabetes ([facility] [address]     Hx of long term use of blood thinners     Hypertension     Thyroid disease       Past Surgical History:   Procedure Laterality Date    HX  SECTION      HX GYN       Family History   Problem Relation Age of Onset    Hypertension Other     Diabetes Other     Heart Disease Other       Social History     Tobacco Use    Smoking status: Never    Smokeless tobacco: Never   Substance Use Topics    Alcohol use: No       Prior to Admission medications    Medication Sig Start Date End Date Taking?  Authorizing Provider   gabapentin (NEURONTIN) 300 mg capsule Take 300 mg by mouth nightly. Take 1 capsule by mouth every day at bedtime, and 1 capsule during the day as needed    Provider, Historical   amLODIPine-benazepril (LOTREL) 5-10 mg per capsule Take 1 Capsule by mouth daily. Provider, Historical   amLODIPine (NORVASC) 5 mg tablet Take 5 mg by mouth daily. Provider, Historical   oxybutynin (DITROPAN) 5 mg tablet Take 5 mg by mouth two (2) times a day. Provider, Historical   fluocinoNIDE (LIDEX) 0.05 % ointment Apply  to affected area two (2) times a day. 1/22/21   Thomas Harley MD   loratadine (CLARITIN) 10 mg tablet TAKE 1 TABLET BY MOUTH EVERY DAY 12/7/20   Thomas Harley MD   levothyroxine (SYNTHROID) 25 mcg tablet TAKE 1 TABLET BY MOUTH EVERY DAY  Patient taking differently: 50 mcg. TAKE 1 TABLET BY MOUTH EVERY DAY 12/7/20   Thomas Harley MD   Jardiance 25 mg tablet TAKE 1 TABLET BY MOUTH EVERY DAY 12/7/20   Thomas Harley MD   busPIRone (BUSPAR) 5 mg tablet Take 1 Tab by mouth three (3) times daily. Indications: repeated episodes of anxiety 12/7/20   Thomas Harley MD   DULoxetine (CYMBALTA) 20 mg capsule Take 1 Cap by mouth two (2) times a day. Indications: anxiousness associated with depression  Patient taking differently: Take 30 mg by mouth two (2) times a day. Indications: anxiousness associated with depression 12/7/20   Thomas Harley MD   SITagliptin-metFORMIN (Janumet XR) 50-1,000 mg TM24 TAKE 1 TABLET BY MOUTH TWICE A DAY 12/7/20   Thomas Harley MD   warfarin (COUMADIN) 2 mg tablet Take 6 mg by mouth daily. 8/14/20   Provider, Historical   sotaloL (BETAPACE) 80 mg tablet Take 80 mg by mouth two (2) times a day. 8/3/20   Provider, Historical   cyclobenzaprine (FLEXERIL) 10 mg tablet Take 10 mg by mouth three (3) times daily. 6/11/20   Provider, Historical   clotrimazole (LOTRIMIN) 1 % topical cream Apply  to affected area two (2) times a day.  On FEET 9/1/20   Thomas Harley MD   betamethasone dipropionate (DIPROSONE) 0.05 % topical cream Apply  to affected area two (2) times daily as needed for Skin Irritation. Of face 9/1/20   Jailene Krishnamurthy MD   albuterol (PROVENTIL HFA, VENTOLIN HFA, PROAIR HFA) 90 mcg/actuation inhaler Take 2 Puffs by inhalation every four (4) hours as needed for Wheezing.     Other, MD Nahid     Allergies   Allergen Reactions    Cephalexin Unknown (comments)    Sulfa (Sulfonamide Antibiotics) Unknown (comments)    Zofran [Ondansetron Hcl] Unknown (comments)          Signed By: Judi Varghese MD     August 25, 2022

## 2022-09-01 ENCOUNTER — HOSPITAL ENCOUNTER (OUTPATIENT)
Dept: WOUND CARE | Age: 67
Discharge: HOME | End: 2022-09-01

## 2022-09-01 DIAGNOSIS — L97.212 CHRONIC ULCER OF RIGHT CALF WITH FAT LAYER EXPOSED (HCC): ICD-10-CM

## 2022-09-01 DIAGNOSIS — L97.222 CALF ULCER, LEFT, WITH FAT LAYER EXPOSED (HCC): Primary | ICD-10-CM

## 2022-09-01 NOTE — DISCHARGE INSTRUCTIONS
Discharge Instructions for  [facility]  [address] [place], [address] [facility]  Telephone: [phone] 215 [facility] Information: Should you experience any significant changes in your wound(s) or have questions about your wound care, please contact the [facility] at [address] [place] [address] Street 8:00 am - 4:30. If you need help with your wound outside these hours and cannot wait until we are again available, contact your PCP or go to the hospital emergency room. NAME:  Lori Siddiqi  YOB: 1955  DATE:  9/1/2022    : Jammie     [] Home Healthcare: TO:      Wound Cleansing:   Do not scrub or use excessive force. Cleanse wound prior to applying a clean dressing with:  [] Normal Saline   [x] Keep Wound Dry in Shower - may purchase a cast cover at local pharmacy     [x] Cleanse wound with Mild Soap & Water on dressing changes   [] May Shower at Discharge: remove dressing 1st, redress wound right after with a new dressing  [] Do not shower  [] cleanse with baby shampoo lather leave 2-3 then rinse with water    Topical Treatments:  Do not apply lotions, creams, or ointments to wound bed unless directed. [x] Apply moisturizing lotion A&D ointment to skin surrounding the wound prior to dressing change.   [] Other:     Dressings:                   Wound Location Right and Left lower legs     Apply Primary Dressing:      [x] Mesalt and cover with  two layer calamine milikan wrap     Cover and Secure with:  [x] Gauze [] ABD [] exudry     [] Ajay [] Kerlix [] Mepilex Border  [] Ace Wrap [] Roll Tape   [x] Other: two layer calamine milikan wrap     Change dressing:   [] Daily      [] Every Other Day   [] Three times per week  [x] Once a week at wound center    [] Do Not Change Dressing     [] Other: 2 X week    Edema Control: Every morning immediately when getting up should be applied to affected leg(s) from mid foot to knee making sure to cover the heel. Remove every night before going to bed if desired. Apply: [] Compression Stocking      []Right Leg           []Left Leg   [] Tubigrip   [] Right Leg Single Layer  [] Right Leg Double Layer                              [] Left Leg Single Layer [] Left Leg Double Layer      Compression: Do not get leg(s) with wrap wet. If wraps become too tight call the center or completely remove the wrap. Apply: [] Three Layer Compression Wrap  []RightLeg []Left Leg  [] Four layer Compression Wrap      []RightLeg []Left Leg   [x] Two Layer calamine wrap                                 [x] Right Leg   [x] Left Leg       [x] Elevate leg(s) above the level of the heart when sitting. [x] Avoid prolonged standing in one place. Dietary:  [x] Diet as tolerated [] Diabetic Diet   [x] Increase Protein: examples (Meat, cheese, eggs, greek yogurt, fish, nuts)   [] Catrachito Therapeutic Nutrition Powder  [] Other:  [] Dial a Dietician : Call [employer] at [phone] enter code [phone]) when prompted. M-F 9am-5pm EST. Return Appointment:    [x] Return Appointment: With Dr. Carla Lentz in 1 week(s)  [] Ordered tests:      PLEASE NOTE: IF YOU ARE UNABLE TO OBTAIN WOUND SUPPLIES, CONTINUE TO USE THE SUPPLIES YOU HAVE AVAILABLE UNTIL YOU ARE ABLE TO REACH US. IT IS MOST IMPORTANT TO KEEP THE WOUND COVERED AT ALL TIMES.      Physician Signature:_______________________  Dr. Carla Lentz

## 2022-09-06 ENCOUNTER — HOSPITAL ENCOUNTER (OUTPATIENT)
Dept: WOUND CARE | Age: 67
Discharge: HOME | End: 2022-09-06
Payer: MEDICAID

## 2022-09-06 VITALS
DIASTOLIC BLOOD PRESSURE: 78 MMHG | TEMPERATURE: 98.1 F | HEART RATE: 72 BPM | SYSTOLIC BLOOD PRESSURE: 139 MMHG | RESPIRATION RATE: 17 BRPM

## 2022-09-06 DIAGNOSIS — L97.212 CHRONIC ULCER OF RIGHT CALF WITH FAT LAYER EXPOSED (HCC): ICD-10-CM

## 2022-09-06 DIAGNOSIS — L97.222 CALF ULCER, LEFT, WITH FAT LAYER EXPOSED (HCC): Primary | ICD-10-CM

## 2022-09-06 PROCEDURE — 11042 DBRDMT SUBQ TIS 1ST 20SQCM/<: CPT

## 2022-09-06 PROCEDURE — 74011000250 HC RX REV CODE- 250: Performed by: PODIATRIST

## 2022-09-06 RX ORDER — LIDOCAINE HYDROCHLORIDE 40 MG/ML
SOLUTION TOPICAL ONCE
Status: CANCELLED | OUTPATIENT
Start: 2022-09-06 | End: 2022-09-06

## 2022-09-06 RX ORDER — LIDOCAINE 50 MG/G
OINTMENT TOPICAL ONCE
Status: CANCELLED | OUTPATIENT
Start: 2022-09-06 | End: 2022-09-06

## 2022-09-06 RX ORDER — LIDOCAINE HYDROCHLORIDE 20 MG/ML
JELLY TOPICAL ONCE
Status: CANCELLED | OUTPATIENT
Start: 2022-09-06 | End: 2022-09-06

## 2022-09-06 RX ORDER — MUPIROCIN 20 MG/G
OINTMENT TOPICAL ONCE
Status: CANCELLED | OUTPATIENT
Start: 2022-09-06 | End: 2022-09-06

## 2022-09-06 RX ORDER — LIDOCAINE 40 MG/G
CREAM TOPICAL ONCE
Status: CANCELLED | OUTPATIENT
Start: 2022-09-06 | End: 2022-09-06

## 2022-09-06 RX ORDER — GENTAMICIN SULFATE 1 MG/G
OINTMENT TOPICAL ONCE
Status: CANCELLED | OUTPATIENT
Start: 2022-09-06 | End: 2022-09-06

## 2022-09-06 RX ORDER — TRIAMCINOLONE ACETONIDE 1 MG/G
OINTMENT TOPICAL ONCE
Status: CANCELLED | OUTPATIENT
Start: 2022-09-06 | End: 2022-09-06

## 2022-09-06 RX ORDER — BACITRACIN 500 [USP'U]/G
OINTMENT TOPICAL ONCE
Status: CANCELLED | OUTPATIENT
Start: 2022-09-06 | End: 2022-09-06

## 2022-09-06 RX ORDER — CLOBETASOL PROPIONATE 0.5 MG/G
OINTMENT TOPICAL ONCE
Status: CANCELLED | OUTPATIENT
Start: 2022-09-06 | End: 2022-09-06

## 2022-09-06 RX ADMIN — Medication: at 09:20

## 2022-09-06 NOTE — WOUND CARE
09/06/22 0909 09/06/22 0916   Wound Leg lower Right;Lateral;Proximal #2   Date First Assessed/Time First Assessed: 08/18/22 1305   Present on Hospital Admission: Yes  Location: Leg lower  Wound Location Orientation: Right;Lateral;Proximal  Wound Description: #2   Wound Length (cm) 0 cm  --    Wound Width (cm) 0 cm  --    Wound Depth (cm) 0 cm  --    Wound Surface Area (cm^2) 0 cm^2  --    Wound Volume (cm^3) 0 cm^3  --    Wound Leg lower Medial;Right #4   Date First Assessed/Time First Assessed: 08/25/22 1315   Present on Hospital Admission: Yes  Location: Leg lower  Wound Location Orientation: Medial;Right  Wound Description: #4   Wound Length (cm) 0 cm  --    Wound Width (cm) 0 cm  --    Wound Depth (cm) 0 cm  --    Wound Surface Area (cm^2) 0 cm^2  --    Wound Volume (cm^3) 0 cm^3  --    Wound Leg lower Left;Lateral #3 Cluster   Date First Assessed/Time First Assessed: 08/18/22 1306   Present on Hospital Admission: Yes  Location: Leg lower  Wound Location Orientation: Left;Lateral  Wound Description: #3 Cluster   Wound Length (cm) 0.1 cm  --    Wound Width (cm) 0.1 cm  --    Wound Depth (cm) 0.1 cm  --    Wound Surface Area (cm^2) 0.01 cm^2  --    Wound Volume (cm^3) 0.001 cm^3  --    Wound Leg lower Distal;Right #1   Date First Assessed/Time First Assessed: 08/18/22 1305   Present on Hospital Admission: Yes  Location: Leg lower  Wound Location Orientation: Distal;Right  Wound Description: #1   Wound Length (cm) 0.7 cm  --    Wound Width (cm) 0.4 cm  --    Wound Depth (cm) 0.1 cm  --    Wound Surface Area (cm^2) 0.28 cm^2  --    Wound Volume (cm^3) 0.028 cm^3  --    Wound Leg Anterior;Right #5   Date First Assessed/Time First Assessed: 09/06/22 0915   Present on Hospital Admission: Yes  Location: Leg  Wound Location Orientation: Anterior;Right  Wound Description: #5   Wound Image  --     Dressing Status  --  Old drainage noted   Cleansed  --  Soap and water   Wound Length (cm)  --  2.3 cm   Wound Width (cm) --  1.6 cm   Wound Depth (cm)  --  0.1 cm   Wound Surface Area (cm^2)  --  3.68 cm^2   Wound Volume (cm^3)  --  0.368 cm^3   Wound Assessment  --  [place]/red;Slough   Drainage Amount  --  Small   Drainage Description  --  Serous   Wound Odor  --  None   Buffy-Wound/Incision Assessment  --  Blanchable erythema;Fragile   Edges  --  Undefined edges   Wound Thickness Description  --  Partial thickness

## 2022-09-06 NOTE — WOUND CARE
09/06/22 0935   Right Leg Edema Point of Measurement   Compression Therapy 2 layer compression wrap   Left Leg Edema Point of Measurement   Compression Therapy 2 layer compression wrap   Wound Leg Anterior;Right #5   Date First Assessed/Time First Assessed: 09/06/22 0915   Present on Hospital Admission: Yes  Location: Leg  Wound Location Orientation: Anterior;Right  Wound Description: #5   Dressing Status New dressing applied   Dressing/Treatment Other (Comment);Gauze dressing/dressing sponge  (mesalt)   Discharge Condition: Stable     Pain: 0    Ambulatory Status: Walking    Discharge Destination: Home     Transportation: Car    Accompanied by: Self     Discharge instructions reviewed with Patient and copy or written instructions have been provided. All questions/concerns have been addressed at this time. Multilayer Compression Wrap   (Not Unna) Below the Knee    NAME:  Michelle Bennett OF BIRTH:  1955  MEDICAL RECORD NUMBER:  [id number]  DATE:  9/6/2022    Removed old Multilayer wrap if indicated and wash leg with mild soap/water. Applied moisturizing agent to dry skin as needed. Applied primary and secondary dressing as ordered. Applied multilayered dressing below the knee to right lower leg. Applied multilayered dressing below the knee to left lower leg. Instructed patient/caregiver not to remove dressing and to keep it clean and dry. Instructed patient/caregiver on complications to report to provider, such as pain, numbness in toes, heavy drainage, and slippage of dressing. Instructed patient on purpose of compression dressing and on activity and exercise recommendations.     Response to treatment: Well tolerated by patient       Electronically signed by David Jones on 9/6/2022 at 9:39 AM

## 2022-09-06 NOTE — WOUND CARE
[address]   Progress Note and Procedure Note     Manuel Alvarenga RECORD NUMBER:  [id number]  AGE: 73 y.o. RACE WHITE/NON-  GENDER: female  : 1955  EPISODE DATE:  2022    Subjective:     Chief Complaint   Patient presents with    Follow-up     Left and right leg wounds          HISTORY of PRESENT ILLNESS SHERWIN Oshea is a 73 y.o. female who presents today for wound/ulcer evaluation. History of Wound Context: Ms. Riley ashford presents with bilateral leg wounds and edema present for months being treated by Dr. Stephan Corcoran. Wound/Ulcer Pain Timing/Severity: none  Quality of pain: n/a  Severity:  0 / 10   Modifying Factors: Pain is relieved/improved with rest  Associated Signs/Symptoms: edema    Ulcer Identification:  Ulcer Type: venous    Contributing Factors: venous stasis and diabetes    Wound: b/l legs         PAST MEDICAL HISTORY    Past Medical History:   Diagnosis Date    Allergies     Asthma     Cardiac arrhythmia     Diabetes ([address]     Hx of long term use of blood thinners     Hypertension     Thyroid disease         PAST SURGICAL HISTORY    Past Surgical History:   Procedure Laterality Date    HX  SECTION      HX GYN         FAMILY HISTORY    Family History   Problem Relation Age of Onset    Hypertension Other     Diabetes Other     Heart Disease Other        SOCIAL HISTORY    Social History     Tobacco Use    Smoking status: Never    Smokeless tobacco: Never   Substance Use Topics    Alcohol use: No    Drug use: Never       ALLERGIES    Allergies   Allergen Reactions    Cephalexin Unknown (comments)    Sulfa (Sulfonamide Antibiotics) Unknown (comments)    Zofran [Ondansetron Hcl] Unknown (comments)       MEDICATIONS    Current Outpatient Medications on File Prior to Encounter   Medication Sig Dispense Refill    CIPROFLOXACIN PO Take  by mouth two (2) times a day.  Taking for 7 days for UTI      gabapentin (NEURONTIN) 300 mg capsule Take 300 mg by mouth nightly. Take 1 capsule by mouth every day at bedtime, and 1 capsule during the day as needed      amLODIPine-benazepril (LOTREL) 5-10 mg per capsule Take 1 Capsule by mouth daily. amLODIPine (NORVASC) 5 mg tablet Take 5 mg by mouth daily. oxybutynin (DITROPAN) 5 mg tablet Take 5 mg by mouth two (2) times a day. fluocinoNIDE (LIDEX) 0.05 % ointment Apply  to affected area two (2) times a day. 30 g 1    loratadine (CLARITIN) 10 mg tablet TAKE 1 TABLET BY MOUTH EVERY DAY 90 Tab 1    levothyroxine (SYNTHROID) 25 mcg tablet TAKE 1 TABLET BY MOUTH EVERY DAY (Patient taking differently: 50 mcg. TAKE 1 TABLET BY MOUTH EVERY DAY) 90 Tab 1    Jardiance 25 mg tablet TAKE 1 TABLET BY MOUTH EVERY DAY 90 Tab 1    busPIRone (BUSPAR) 5 mg tablet Take 1 Tab by mouth three (3) times daily. Indications: repeated episodes of anxiety 270 Tab 1    DULoxetine (CYMBALTA) 20 mg capsule Take 1 Cap by mouth two (2) times a day. Indications: anxiousness associated with depression (Patient taking differently: Take 30 mg by mouth two (2) times a day. Indications: anxiousness associated with depression) 180 Cap 1    SITagliptin-metFORMIN (Janumet XR) 50-1,000 mg TM24 TAKE 1 TABLET BY MOUTH TWICE A  Tab 0    warfarin (COUMADIN) 2 mg tablet Take 6 mg by mouth daily. sotaloL (BETAPACE) 80 mg tablet Take 80 mg by mouth two (2) times a day. cyclobenzaprine (FLEXERIL) 10 mg tablet Take 10 mg by mouth three (3) times daily. clotrimazole (LOTRIMIN) 1 % topical cream Apply  to affected area two (2) times a day. On FEET 60 g 2    betamethasone dipropionate (DIPROSONE) 0.05 % topical cream Apply  to affected area two (2) times daily as needed for Skin Irritation. Of face 45 g 1    albuterol (PROVENTIL HFA, VENTOLIN HFA, PROAIR HFA) 90 mcg/actuation inhaler Take 2 Puffs by inhalation every four (4) hours as needed for Wheezing.        No current facility-administered medications on file prior to encounter. REVIEW OF SYSTEMS    Consitutional: no weight loss, night sweats, fatigue / malaise / lethargy. Musculoskeletal: no joint / extremity pain, misalignment, stiffness, decreased ROM, crepitus. Integument: No pruritis, rashes, lesions, left leg wounds. Psychiatric: No depression, anxiety, paranoia    Objective:     Visit Vitals  /78 (BP 1 Location: Right upper arm, BP Patient Position: At rest)   Pulse 72   Temp 98.1 °F (36.7 °C)   Resp 17       Wt Readings from Last 3 Encounters:   12/07/20 103.4 kg (228 lb)   09/01/20 107.5 kg (237 lb)   01/27/20 117.9 kg (260 lb)       PHYSICAL EXAM  Vascular:  B/L LE  DP 1/4; PT 1/4  capillary fill time brisk, pitting edema is present, skin temperature is cool, varicosities are present. Dermatological:     Wound: 1  Location: Left leg  Margins: intact  Drainage: serous  Odor: none  Wound base: fibrotic  Lymphangitic streaking? No  Undermining? No  Sinus tracts? No  Exposed bone? No  Subcutaneous crepitation on palpation? No    Wound: 2  Location: Right leg  Margins: intact  Drainage: serous  Odor: none  Wound base: fibrotic  Lymphangitic streaking? No  Undermining? No  Sinus tracts? No  Exposed bone? No  Subcutaneous crepitation on palpation? No        Nails are thickened, elongated, discolored, with subungual debris. 3mm thick. Skin is dry and scaly, exhibits hemosiderin deposition. There is no maceration of the interspaces of the feet b/l. Neurological:  DTR are present, protective sensation per 5.07 [place] monofilament is intact, patient is AAOx3, mood is normal. Epicritic sensation is intact. Orthopedic:  B/L LE are symmetric, ROM of ankle, STJ, 1st MTPJ is limited, MMT 5 out of 5 for B/L LE. No pedal amputations. Constitutional: Pt is a well developed, elderly female. Hard of healing. Lymphatics: negative tenderness to palpation of neck/axillary/inguinal nodes.      Imaging / Labs / Cx / Px:  N/a      Assessment: Problem List Items Addressed This Visit          Integumentary    Calf ulcer, left, with fat layer exposed ([facility] [address] - Primary    Relevant Medications    CIPROFLOXACIN PO    Other Relevant Orders    INITIATE OUTPATIENT WOUND CARE PROTOCOL       Other    Chronic ulcer of right calf with fat layer exposed ([facility]    Relevant Orders    INITIATE OUTPATIENT WOUND CARE PROTOCOL       Wound Leg lower Distal;Right #1 (Active)   Wound Image   09/06/22 0909   Wound Etiology Venous 08/25/22 1306   Dressing Status Old drainage noted 09/06/22 0909   Cleansed Soap and water 09/06/22 0909   Dressing/Treatment Betadine swabs/Povidone Iodine;Gauze dressing/dressing sponge 08/18/22 1349   Wound Length (cm) 0.7 cm 09/06/22 0909   Wound Width (cm) 0.4 cm 09/06/22 0909   Wound Depth (cm) 0.1 cm 09/06/22 0909   Wound Surface Area (cm^2) 0.28 cm^2 09/06/22 0909   Change in Wound Size % 99.87 09/06/22 0909   Wound Volume (cm^3) 0.028 cm^3 09/06/22 0909   Wound Healing % 100 09/06/22 0909   Post-Procedure Length (cm) 0.8 cm 08/25/22 1336   Post-Procedure Width (cm) 0.4 cm 08/25/22 1336   Post-Procedure Depth (cm) 0.2 cm 08/25/22 1336   Post-Procedure Surface Area (cm^2) 0.32 cm^2 08/25/22 1336   Post-Procedure Volume (cm^3) 0.064 cm^3 08/25/22 1336   Wound Assessment Slough;[place]/red 09/06/22 0909   Drainage Amount Small 09/06/22 0909   Drainage Description Serous 09/06/22 0909   Wound Odor None 09/06/22 0909   Buffy-Wound/Incision Assessment Blanchable erythema 09/06/22 0909   Edges Flat/open edges 09/06/22 0909   Wound Thickness Description Partial thickness 09/06/22 0909   Number of days: 19       Wound Leg lower Right;Lateral;Proximal #2 (Active)   Wound Image   09/06/22 0909   Wound Etiology Venous 08/25/22 1306   Dressing Status Old drainage noted 08/25/22 1306   Cleansed Soap and water 08/25/22 1306   Dressing/Treatment Betadine swabs/Povidone Iodine;Gauze dressing/dressing sponge 08/18/22 1349   Wound Length (cm) 0 cm 09/06/22 6734 Wound Width (cm) 0 cm 09/06/22 0909   Wound Depth (cm) 0 cm 09/06/22 0909   Wound Surface Area (cm^2) 0 cm^2 09/06/22 0909   Change in Wound Size % 100 09/06/22 0909   Wound Volume (cm^3) 0 cm^3 09/06/22 0909   Wound Healing % 100 09/06/22 0909   Post-Procedure Length (cm) 1.5 cm 08/25/22 1336   Post-Procedure Width (cm) 0.8 cm 08/25/22 1336   Post-Procedure Depth (cm) 0.2 cm 08/25/22 1336   Post-Procedure Surface Area (cm^2) 1.2 cm^2 08/25/22 1336   Post-Procedure Volume (cm^3) 0.24 cm^3 08/25/22 1336   Wound Assessment Slough;[place]ink/red 08/25/22 1306   Drainage Amount None 09/06/22 0909   Drainage Description Serosanguinous 08/25/22 1306   Wound Odor None 09/06/22 0909   Buffy-Wound/Incision Assessment Blanchable erythema 09/06/22 0909   Edges Attached edges 09/06/22 0909   Wound Thickness Description Partial thickness 08/25/22 1306   Number of days: 19       Wound Leg lower Left;Lateral #3 Cluster (Active)   Wound Image   09/06/22 0909   Wound Etiology Venous 08/25/22 1306   Dressing Status Old drainage noted 09/06/22 0909   Cleansed Soap and water 08/25/22 1306   Dressing/Treatment Betadine swabs/Povidone Iodine;Gauze dressing/dressing sponge 08/18/22 1349   Wound Length (cm) 0.1 cm 09/06/22 0909   Wound Width (cm) 0.1 cm 09/06/22 0909   Wound Depth (cm) 0.1 cm 09/06/22 0909   Wound Surface Area (cm^2) 0.01 cm^2 09/06/22 0909   Change in Wound Size % 99.98 09/06/22 0909   Wound Volume (cm^3) 0.001 cm^3 09/06/22 0909   Wound Healing % 100 09/06/22 0909   Post-Procedure Length (cm) 7 cm 08/25/22 1336   Post-Procedure Width (cm) 4.2 cm 08/25/22 1336   Post-Procedure Depth (cm) 0.1 cm 08/25/22 1336   Post-Procedure Surface Area (cm^2) 29.4 cm^2 08/25/22 1336   Post-Procedure Volume (cm^3) 2.94 cm^3 08/25/22 1336   Wound Assessment Slough;Pink/red 08/25/22 1306   Drainage Amount None 09/06/22 0909   Drainage Description Serosanguinous 08/25/22 1306   Wound Odor None 09/06/22 0909   Buffy-Wound/Incision Assessment Blanchable erythema;Dry/flaky 09/06/22 0909   Edges Attached edges 09/06/22 0909   Wound Thickness Description Partial thickness 08/25/22 1306   Number of days: 19       Wound Leg lower Medial;Right #4 (Active)   Wound Image   09/06/22 0909   Dressing Status Old drainage noted 09/06/22 0909   Cleansed Soap and water 09/06/22 0909   Wound Length (cm) 0 cm 09/06/22 0909   Wound Width (cm) 0 cm 09/06/22 0909   Wound Depth (cm) 0 cm 09/06/22 0909   Wound Surface Area (cm^2) 0 cm^2 09/06/22 0909   Change in Wound Size % 100 09/06/22 0909   Wound Volume (cm^3) 0 cm^3 09/06/22 0909   Wound Healing % 100 09/06/22 0909   Post-Procedure Length (cm) 2 cm 08/25/22 1336   Post-Procedure Width (cm) 1.6 cm 08/25/22 1336   Post-Procedure Depth (cm) 0.3 cm 08/25/22 1336   Post-Procedure Surface Area (cm^2) 3.2 cm^2 08/25/22 1336   Post-Procedure Volume (cm^3) 0.96 cm^3 08/25/22 1336   Wound Assessment [place]/red;Slough 08/25/22 1306   Drainage Amount None 09/06/22 0909   Drainage Description Serosanguinous 08/25/22 1306   Wound Odor None 09/06/22 0909   Buffy-Wound/Incision Assessment Blanchable erythema 09/06/22 0909   Edges Attached edges 09/06/22 0909   Number of days: 12       Wound Leg Anterior;Right #5 (Active)   Wound Image   09/06/22 0916   Dressing Status New dressing applied 09/06/22 0935   Cleansed Soap and water 09/06/22 0916   Dressing/Treatment Other (Comment);Gauze dressing/dressing sponge 09/06/22 0935   Wound Length (cm) 2.3 cm 09/06/22 0916   Wound Width (cm) 1.6 cm 09/06/22 0916   Wound Depth (cm) 0.1 cm 09/06/22 0916   Wound Surface Area (cm^2) 3.68 cm^2 09/06/22 0916   Wound Volume (cm^3) 0.368 cm^3 09/06/22 0916   Wound Assessment [place]/red;Slough 09/06/22 0916   Drainage Amount Small 09/06/22 0916   Drainage Description Serous 09/06/22 0916   Wound Odor None 09/06/22 0916   Buffy-Wound/Incision Assessment Blanchable erythema;Fragile 09/06/22 0916   Edges Undefined edges 09/06/22 0916   Wound Thickness Description Partial thickness 09/06/22 0916   Number of days: 0       Debridement Wound Care        Problem List Items Addressed This Visit          Integumentary    Calf ulcer, left, with fat layer exposed ([address] - Primary    Relevant Medications    CIPROFLOXACIN PO    Other Relevant Orders    INITIATE OUTPATIENT WOUND CARE PROTOCOL       Other    Chronic ulcer of right calf with fat layer exposed ([facility] [address]    Relevant Orders    INITIATE OUTPATIENT WOUND CARE PROTOCOL       Procedure Note  Indications:  Based on my examination of this patient's wound(s)/ulcer(s) today, debridement is required to promote healing and evaluate the wound base.     Performed by: Chuyita Easton DPM    Consent obtained: Yes    Time out taken: Yes    Debridement: Excisional    Using curette the wound(s)/ulcer(s) was/were sharply debrided down through and including the removal of    subcutaneous tissue    Devitalized Tissue Debrided: slough and callus    Pre Debridement Measurements:  Are located in the Wound/Ulcer Documentation Flow Sheet    Non-Pressure ulcer, fat layer exposed    Wound/Ulcer #: 1 and 2    Post Debridement Measurements:  Wound/Ulcer Descriptions are Pre Debridement except measurements:    Wound Leg lower Distal;Right #1 (Active)   Wound Image   09/06/22 0909   Wound Etiology Venous 08/25/22 1306   Dressing Status Old drainage noted 09/06/22 0909   Cleansed Soap and water 09/06/22 0909   Dressing/Treatment Betadine swabs/Povidone Iodine;Gauze dressing/dressing sponge 08/18/22 1349   Wound Length (cm) 0.7 cm 09/06/22 0909   Wound Width (cm) 0.4 cm 09/06/22 0909   Wound Depth (cm) 0.1 cm 09/06/22 0909   Wound Surface Area (cm^2) 0.28 cm^2 09/06/22 0909   Change in Wound Size % 99.87 09/06/22 0909   Wound Volume (cm^3) 0.028 cm^3 09/06/22 0909   Wound Healing % 100 09/06/22 0909   Post-Procedure Length (cm) 0.8 cm 08/25/22 1336   Post-Procedure Width (cm) 0.4 cm 08/25/22 1336   Post-Procedure Depth (cm) 0.2 cm 08/25/22 1336   Post-Procedure Surface Area (cm^2) 0.32 cm^2 08/25/22 1336   Post-Procedure Volume (cm^3) 0.064 cm^3 08/25/22 1336   Wound Assessment Slough;[place]/red 09/06/22 0909   Drainage Amount Small 09/06/22 0909   Drainage Description Serous 09/06/22 0909   Wound Odor None 09/06/22 0909   Buffy-Wound/Incision Assessment Blanchable erythema 09/06/22 0909   Edges Flat/open edges 09/06/22 0909   Wound Thickness Description Partial thickness 09/06/22 0909   Number of days: 19       Wound Leg lower Right;Lateral;Proximal #2 (Active)   Wound Image   09/06/22 0909   Wound Etiology Venous 08/25/22 1306   Dressing Status Old drainage noted 08/25/22 1306   Cleansed Soap and water 08/25/22 1306   Dressing/Treatment Betadine swabs/Povidone Iodine;Gauze dressing/dressing sponge 08/18/22 1349   Wound Length (cm) 0 cm 09/06/22 0909   Wound Width (cm) 0 cm 09/06/22 0909   Wound Depth (cm) 0 cm 09/06/22 0909   Wound Surface Area (cm^2) 0 cm^2 09/06/22 0909   Change in Wound Size % 100 09/06/22 0909   Wound Volume (cm^3) 0 cm^3 09/06/22 0909   Wound Healing % 100 09/06/22 0909   Post-Procedure Length (cm) 1.5 cm 08/25/22 1336   Post-Procedure Width (cm) 0.8 cm 08/25/22 1336   Post-Procedure Depth (cm) 0.2 cm 08/25/22 1336   Post-Procedure Surface Area (cm^2) 1.2 cm^2 08/25/22 1336   Post-Procedure Volume (cm^3) 0.24 cm^3 08/25/22 1336   Wound Assessment Slough;Pink/red 08/25/22 1306   Drainage Amount None 09/06/22 0909   Drainage Description Serosanguinous 08/25/22 1306   Wound Odor None 09/06/22 0909   Buffy-Wound/Incision Assessment Blanchable erythema 09/06/22 0909   Edges Attached edges 09/06/22 0909   Wound Thickness Description Partial thickness 08/25/22 1306   Number of days: 19       Wound Leg lower Left;Lateral #3 Cluster (Active)   Wound Image   09/06/22 0909   Wound Etiology Venous 08/25/22 1306   Dressing Status Old drainage noted 09/06/22 0909   Cleansed Soap and water 08/25/22 1306   Dressing/Treatment Betadine swabs/Povidone Iodine;Gauze dressing/dressing sponge 08/18/22 1349   Wound Length (cm) 0.1 cm 09/06/22 0909   Wound Width (cm) 0.1 cm 09/06/22 0909   Wound Depth (cm) 0.1 cm 09/06/22 0909   Wound Surface Area (cm^2) 0.01 cm^2 09/06/22 0909   Change in Wound Size % 99.98 09/06/22 0909   Wound Volume (cm^3) 0.001 cm^3 09/06/22 0909   Wound Healing % 100 09/06/22 0909   Post-Procedure Length (cm) 7 cm 08/25/22 1336   Post-Procedure Width (cm) 4.2 cm 08/25/22 1336   Post-Procedure Depth (cm) 0.1 cm 08/25/22 1336   Post-Procedure Surface Area (cm^2) 29.4 cm^2 08/25/22 1336   Post-Procedure Volume (cm^3) 2.94 cm^3 08/25/22 1336   Wound Assessment Slough;Pink/red 08/25/22 1306   Drainage Amount None 09/06/22 0909   Drainage Description Serosanguinous 08/25/22 1306   Wound Odor None 09/06/22 0909   Buffy-Wound/Incision Assessment Blanchable erythema;Dry/flaky 09/06/22 0909   Edges Attached edges 09/06/22 0909   Wound Thickness Description Partial thickness 08/25/22 1306   Number of days: 19       Wound Leg lower Medial;Right #4 (Active)   Wound Image   09/06/22 0909   Dressing Status Old drainage noted 09/06/22 0909   Cleansed Soap and water 09/06/22 0909   Wound Length (cm) 0 cm 09/06/22 0909   Wound Width (cm) 0 cm 09/06/22 0909   Wound Depth (cm) 0 cm 09/06/22 0909   Wound Surface Area (cm^2) 0 cm^2 09/06/22 0909   Change in Wound Size % 100 09/06/22 0909   Wound Volume (cm^3) 0 cm^3 09/06/22 0909   Wound Healing % 100 09/06/22 0909   Post-Procedure Length (cm) 2 cm 08/25/22 1336   Post-Procedure Width (cm) 1.6 cm 08/25/22 1336   Post-Procedure Depth (cm) 0.3 cm 08/25/22 1336   Post-Procedure Surface Area (cm^2) 3.2 cm^2 08/25/22 1336   Post-Procedure Volume (cm^3) 0.96 cm^3 08/25/22 1336   Wound Assessment [place]/red;Slough 08/25/22 1306   Drainage Amount None 09/06/22 0909   Drainage Description Serosanguinous 08/25/22 1306   Wound Odor None 09/06/22 0909   Buffy-Wound/Incision Assessment Blanchable erythema 09/06/22 0909   Edges Attached edges 09/06/22 4932   Number of days: 12       Wound Leg Anterior;Right #5 (Active)   Wound Image   09/06/22 0916   Dressing Status New dressing applied 09/06/22 0935   Cleansed Soap and water 09/06/22 0916   Dressing/Treatment Other (Comment);Gauze dressing/dressing sponge 09/06/22 0935   Wound Length (cm) 2.3 cm 09/06/22 0916   Wound Width (cm) 1.6 cm 09/06/22 0916   Wound Depth (cm) 0.1 cm 09/06/22 0916   Wound Surface Area (cm^2) 3.68 cm^2 09/06/22 0916   Wound Volume (cm^3) 0.368 cm^3 09/06/22 0916   Wound Assessment [place]/red;Slough 09/06/22 0916   Drainage Amount Small 09/06/22 0916   Drainage Description Serous 09/06/22 0916   Wound Odor None 09/06/22 0916   Buffy-Wound/Incision Assessment Blanchable erythema;Fragile 09/06/22 0916   Edges Undefined edges 09/06/22 0916   Wound Thickness Description Partial thickness 09/06/22 0916   Number of days: 0        Total Surface Area Debrided:  <20 sq cm     Estimated Blood Loss:  Minimal     Hemostasis Achieved: Pressure    Procedural Pain: 0 / 10     Post Procedural Pain: 0 / 10     Response to treatment: Well tolerated by patient     Plan:     Bilateral leg venous hypertension with ulceration (I87.313)    Right leg ulcer to fat (L97.912)    Left leg ulcer to fat (L97.922)    - Pt evaluated and treated  - Pt presents with bilateral leg ulcers  - Wound debrided per procedure note above   - Wound care with mesalt  - Compression achieved with unna boot lite  - Pt to follow up in 1 week    Treatment Note please see attached Discharge Instructions    Written patient dismissal instructions given to patient or POA.          Electronically signed by Aviva Alonso DPM on 9/6/2022 at 10:23 AM

## 2022-09-06 NOTE — WOUND CARE
09/06/22 0909   Wound Leg lower Right;Lateral;Proximal #2   Date First Assessed/Time First Assessed: 08/18/22 1305   Present on Hospital Admission: Yes  Location: Leg lower  Wound Location Orientation: Right;Lateral;Proximal  Wound Description: #2   Wound Image    Wound Length (cm) 0 cm   Wound Width (cm) 0 cm   Wound Depth (cm) 0 cm   Wound Surface Area (cm^2) 0 cm^2   Change in Wound Size % 100   Wound Volume (cm^3) 0 cm^3   Wound Healing % 100   Wound Assessment   (Dried exudate)   Drainage Amount None   Wound Odor None   Buffy-Wound/Incision Assessment Blanchable erythema   Edges Attached edges   Wound Leg lower Medial;Right #4   Date First Assessed/Time First Assessed: 08/25/22 1315   Present on Hospital Admission: Yes  Location: Leg lower  Wound Location Orientation: Medial;Right  Wound Description: #4   Wound Image    Dressing Status Old drainage noted   Cleansed Soap and water   Wound Length (cm) 0 cm   Wound Width (cm) 0 cm   Wound Depth (cm) 0 cm   Wound Surface Area (cm^2) 0 cm^2   Change in Wound Size % 100   Wound Volume (cm^3) 0 cm^3   Wound Healing % 100   Wound Assessment   (Dried exudate)   Drainage Amount None   Wound Odor None   Buffy-Wound/Incision Assessment Blanchable erythema   Edges Attached edges   Wound Leg lower Left;Lateral #3 Cluster   Date First Assessed/Time First Assessed: 08/18/22 1306   Present on Hospital Admission: Yes  Location: Leg lower  Wound Location Orientation: Left;Lateral  Wound Description: #3 Cluster   Wound Image    Dressing Status Old drainage noted   Wound Length (cm) 0.1 cm   Wound Width (cm) 0.1 cm   Wound Depth (cm) 0.1 cm   Wound Surface Area (cm^2) 0.01 cm^2   Change in Wound Size % 99.98   Wound Volume (cm^3) 0.001 cm^3   Wound Healing % 100   Wound Assessment   (Dried exudate)   Drainage Amount None   Wound Odor None   Buffy-Wound/Incision Assessment Blanchable erythema;Dry/flaky   Edges Attached edges   Wound Leg lower Distal;Right #1   Date First Assessed/Time First Assessed: 08/18/22 1305   Present on Hospital Admission: Yes  Location: Leg lower  Wound Location Orientation: Distal;Right  Wound Description: #1   Wound Image    Dressing Status Old drainage noted   Cleansed Soap and water   Wound Length (cm) 0.7 cm   Wound Width (cm) 0.4 cm   Wound Depth (cm) 0.1 cm   Wound Surface Area (cm^2) 0.28 cm^2   Change in Wound Size % 99.87   Wound Volume (cm^3) 0.028 cm^3   Wound Healing % 100   Wound Assessment Slough;Pink/red   Drainage Amount Small   Drainage Description Serous   Wound Odor None   Buffy-Wound/Incision Assessment Blanchable erythema   Edges Flat/open edges   Wound Thickness Description Partial thickness     Visit Vitals  /78 (BP 1 Location: Right upper arm, BP Patient Position: At rest)   Pulse 72   Temp 98.1 °F (36.7 °C)   Resp 17

## 2022-09-06 NOTE — DISCHARGE INSTRUCTIONS
Discharge Instructions for  [facility]  [address] [place], [address] [facility]  Telephone: [phone]    [address] [place] [address] Information: Should you experience any significant changes in your wound(s) or have questions about your wound care, please contact the [facility] at [address] [place] [address] 8:00 am - 4:30. If you need help with your wound outside these hours and cannot wait until we are again available, contact your PCP or go to the hospital emergency room. NAME:  Jayde Streeter  YOB: 1955  DATE:  9/6/2022    : Star Hayward     [] Home Healthcare: TO:      Wound Cleansing:   Do not scrub or use excessive force. Cleanse wound prior to applying a clean dressing with:  [] Normal Saline   [x] Keep Wound Dry in Shower - may purchase a cast cover at local pharmacy     [x] Cleanse wound with Mild Soap & Water on dressing changes   [] May Shower at Discharge: remove dressing 1st, redress wound right after with a new dressing  [] Do not shower  [] cleanse with baby shampoo lather leave 2-3 then rinse with water    Topical Treatments:  Do not apply lotions, creams, or ointments to wound bed unless directed. [x] Apply moisturizing lotion A&D ointment to skin surrounding the wound prior to dressing change.   [] Other:     Dressings:                   Wound Location Right and Left lower legs     Apply Primary Dressing:      [x] Mesalt and cover with  two layer calamine milikan wrap     Cover and Secure with:  [x] Gauze [] ABD [] exudry     [] Ajay [] Kerlix [] Mepilex Border  [] Ace Wrap [] Roll Tape   [x] Other: two layer calamine milikan wrap     Change dressing:   [] Daily      [] Every Other Day   [] Three times per week  [x] Once a week at wound center    [] Do Not Change Dressing     [] Other: 2 X week    Edema Control: Every morning immediately when getting up should be applied to affected leg(s) from mid foot to knee making sure to cover the heel. Remove every night before going to bed if desired. Apply: [] Compression Stocking      []Right Leg           []Left Leg   [] Tubigrip   [] Right Leg Single Layer  [] Right Leg Double Layer                              [] Left Leg Single Layer [] Left Leg Double Layer      Compression: Do not get leg(s) with wrap wet. If wraps become too tight call the center or completely remove the wrap. Apply: [] Three Layer Compression Wrap  []RightLeg []Left Leg  [] Four layer Compression Wrap      []RightLeg []Left Leg   [x] Two Layer calamine wrap                                 [x] Right Leg   [x] Left Leg       [x] Elevate leg(s) above the level of the heart when sitting. [x] Avoid prolonged standing in one place. Dietary:  [x] Diet as tolerated [] Diabetic Diet   [x] Increase Protein: examples (Meat, cheese, eggs, greek yogurt, fish, nuts)   [] Catrachito Therapeutic Nutrition Powder  [] Other:  [] Dial a Dietician : Call [employer] at [phone] enter code ([phone]) when prompted. M-F 9am-5pm EST. Return Appointment:    [x] Return Appointment: With Dr. Brigitte Tobar in 1 week(s)  [] Ordered tests:         PLEASE NOTE: IF YOU ARE UNABLE TO OBTAIN WOUND SUPPLIES, CONTINUE TO USE THE SUPPLIES YOU HAVE AVAILABLE UNTIL YOU ARE ABLE TO REACH US. IT IS MOST IMPORTANT TO KEEP THE WOUND COVERED AT ALL TIMES.      Physician Signature:_______________________  Dr. Raciel Miles

## 2022-09-07 RX ORDER — CEFADROXIL 1000 MG/1
1 TABLET ORAL 2 TIMES DAILY
COMMUNITY

## 2022-09-15 ENCOUNTER — HOSPITAL ENCOUNTER (OUTPATIENT)
Dept: WOUND CARE | Age: 67
Discharge: HOME | End: 2022-09-15
Payer: MEDICAID

## 2022-09-15 VITALS
DIASTOLIC BLOOD PRESSURE: 72 MMHG | HEART RATE: 98 BPM | TEMPERATURE: 98.1 F | RESPIRATION RATE: 19 BRPM | SYSTOLIC BLOOD PRESSURE: 160 MMHG

## 2022-09-15 DIAGNOSIS — L97.222 CALF ULCER, LEFT, WITH FAT LAYER EXPOSED (HCC): Primary | ICD-10-CM

## 2022-09-15 DIAGNOSIS — L97.212 CHRONIC ULCER OF RIGHT CALF WITH FAT LAYER EXPOSED (HCC): ICD-10-CM

## 2022-09-15 PROCEDURE — 99211 OFF/OP EST MAY X REQ PHY/QHP: CPT

## 2022-09-15 PROCEDURE — G0463 HOSPITAL OUTPT CLINIC VISIT: HCPCS

## 2022-09-15 RX ORDER — CLOBETASOL PROPIONATE 0.5 MG/G
OINTMENT TOPICAL ONCE
Status: CANCELLED | OUTPATIENT
Start: 2022-09-15 | End: 2022-09-15

## 2022-09-15 RX ORDER — LIDOCAINE HYDROCHLORIDE 20 MG/ML
JELLY TOPICAL ONCE
Status: CANCELLED | OUTPATIENT
Start: 2022-09-15 | End: 2022-09-15

## 2022-09-15 RX ORDER — LIDOCAINE 50 MG/G
OINTMENT TOPICAL ONCE
Status: CANCELLED | OUTPATIENT
Start: 2022-09-15 | End: 2022-09-15

## 2022-09-15 RX ORDER — TRIAMCINOLONE ACETONIDE 1 MG/G
OINTMENT TOPICAL ONCE
Status: CANCELLED | OUTPATIENT
Start: 2022-09-15 | End: 2022-09-15

## 2022-09-15 RX ORDER — LIDOCAINE 40 MG/G
CREAM TOPICAL ONCE
Status: CANCELLED | OUTPATIENT
Start: 2022-09-15 | End: 2022-09-15

## 2022-09-15 RX ORDER — LIDOCAINE HYDROCHLORIDE 40 MG/ML
SOLUTION TOPICAL ONCE
Status: CANCELLED | OUTPATIENT
Start: 2022-09-15 | End: 2022-09-15

## 2022-09-15 RX ORDER — MUPIROCIN 20 MG/G
OINTMENT TOPICAL ONCE
Status: CANCELLED | OUTPATIENT
Start: 2022-09-15 | End: 2022-09-15

## 2022-09-15 RX ORDER — GENTAMICIN SULFATE 1 MG/G
OINTMENT TOPICAL ONCE
Status: CANCELLED | OUTPATIENT
Start: 2022-09-15 | End: 2022-09-15

## 2022-09-15 RX ORDER — BACITRACIN 500 [USP'U]/G
OINTMENT TOPICAL ONCE
Status: CANCELLED | OUTPATIENT
Start: 2022-09-15 | End: 2022-09-15

## 2022-09-15 NOTE — WOUND CARE
09/15/22 1435   Right Leg Edema Point of Measurement   Compression Therapy Tubular elastic support bandage   Left Leg Edema Point of Measurement   Compression Therapy Tubular elastic support bandage   Wound Foot Anterior;Right;Dorsal #6   Date First Assessed/Time First Assessed: 09/15/22 1413   Location: Foot  Wound Location Orientation: Anterior;Right;Dorsal  Wound Description: #6   Dressing/Treatment Other (Comment)  (Triamcinolone ointment)   Discharge Condition: Stable     Pain: 0    Ambulatory Status: Walking    Discharge Destination: Home     Transportation: Car    Accompanied by: Self     Discharge instructions reviewed with Patient and copy or written instructions have been provided. All questions/concerns have been addressed at this time.

## 2022-09-15 NOTE — WOUND CARE
[address]   Progress Note and Procedure Note   NO Procedure      Manuel Alvarenga RECORD NUMBER:  [id number]  AGE: 73 y.o. RACE WHITE/NON-  GENDER: female  : 1955  EPISODE DATE:  9/15/2022    Subjective:   No new problems  Chief Complaint   Patient presents with    Follow-up     Right lower leg wounds          HISTORY of PRESENT ILLNESS SHERWIN Astorga is a 73 y.o. female who presents today for wound/ulcer evaluation. History of Wound Context: BLE  Wound/Ulcer Pain Timing/Severity: none  Quality of pain: dull  Severity:  0 / 10   Modifying Factors: None  Associated Signs/Symptoms: edema    Ulcer Identification:  Ulcer Type: venous    Contributing Factors: edema    Wound: BLE         PAST MEDICAL HISTORY    Past Medical History:   Diagnosis Date    Allergies     Asthma     Cardiac arrhythmia     Diabetes ([address]     Hx of long term use of blood thinners     Hypertension     Thyroid disease         PAST SURGICAL HISTORY    Past Surgical History:   Procedure Laterality Date    HX  SECTION      HX GYN         FAMILY HISTORY    Family History   Problem Relation Age of Onset    Hypertension Other     Diabetes Other     Heart Disease Other        SOCIAL HISTORY    Social History     Tobacco Use    Smoking status: Never    Smokeless tobacco: Never   Substance Use Topics    Alcohol use: No    Drug use: Never       ALLERGIES    Allergies   Allergen Reactions    Cephalexin Unknown (comments)    Sulfa (Sulfonamide Antibiotics) Unknown (comments)    Zofran [Ondansetron Hcl] Unknown (comments)       MEDICATIONS    Current Outpatient Medications on File Prior to Encounter   Medication Sig Dispense Refill    cefadroxil (DURICEF) 1 gram tablet Take 1 g by mouth two (2) times a day.      gabapentin (NEURONTIN) 300 mg capsule Take 300 mg by mouth nightly.  Take 1 capsule by mouth every day at bedtime, and 1 capsule during the day as needed amLODIPine-benazepril (LOTREL) 5-10 mg per capsule Take 1 Capsule by mouth daily. amLODIPine (NORVASC) 5 mg tablet Take 5 mg by mouth daily. oxybutynin (DITROPAN) 5 mg tablet Take 5 mg by mouth two (2) times a day. fluocinoNIDE (LIDEX) 0.05 % ointment Apply  to affected area two (2) times a day. 30 g 1    loratadine (CLARITIN) 10 mg tablet TAKE 1 TABLET BY MOUTH EVERY DAY 90 Tab 1    levothyroxine (SYNTHROID) 25 mcg tablet TAKE 1 TABLET BY MOUTH EVERY DAY (Patient taking differently: 50 mcg. TAKE 1 TABLET BY MOUTH EVERY DAY) 90 Tab 1    Jardiance 25 mg tablet TAKE 1 TABLET BY MOUTH EVERY DAY 90 Tab 1    busPIRone (BUSPAR) 5 mg tablet Take 1 Tab by mouth three (3) times daily. Indications: repeated episodes of anxiety 270 Tab 1    DULoxetine (CYMBALTA) 20 mg capsule Take 1 Cap by mouth two (2) times a day. Indications: anxiousness associated with depression (Patient taking differently: Take 30 mg by mouth two (2) times a day. Indications: anxiousness associated with depression) 180 Cap 1    SITagliptin-metFORMIN (Janumet XR) 50-1,000 mg TM24 TAKE 1 TABLET BY MOUTH TWICE A  Tab 0    warfarin (COUMADIN) 2 mg tablet Take 6 mg by mouth daily. sotaloL (BETAPACE) 80 mg tablet Take 80 mg by mouth two (2) times a day. cyclobenzaprine (FLEXERIL) 10 mg tablet Take 10 mg by mouth three (3) times daily. clotrimazole (LOTRIMIN) 1 % topical cream Apply  to affected area two (2) times a day. On FEET 60 g 2    betamethasone dipropionate (DIPROSONE) 0.05 % topical cream Apply  to affected area two (2) times daily as needed for Skin Irritation. Of face 45 g 1    albuterol (PROVENTIL HFA, VENTOLIN HFA, PROAIR HFA) 90 mcg/actuation inhaler Take 2 Puffs by inhalation every four (4) hours as needed for Wheezing. No current facility-administered medications on file prior to encounter. REVIEW OF SYSTEMS    Pertinent items are noted in HPI.     Objective:     Visit Vitals  BP (!) 160/72 (BP 1 Location: Right upper arm, BP Patient Position: Sitting)   Pulse 98   Temp 98.1 °F (36.7 °C)   Resp 19       Wt Readings from Last 3 Encounters:   12/07/20 103.4 kg (228 lb)   09/01/20 107.5 kg (237 lb)   01/27/20 117.9 kg (260 lb)       PHYSICAL EXAM  L & R leg wounds closed  Area on dorsum R foot c/w fungal lesion - triamcinolone applied  Pertinent items are noted in HPI. Assessment:    healed  Problem List Items Addressed This Visit       Calf ulcer, left, with fat layer exposed ([address] - Primary    Relevant Orders    INITIATE OUTPATIENT WOUND CARE PROTOCOL    Chronic ulcer of right calf with fat layer exposed ([address]    Relevant Orders    INITIATE OUTPATIENT WOUND CARE PROTOCOL       Procedure Note  Indications:  Based on my examination of this patient's wound(s)/ulcer(s) today, debridement is not required to promote healing and evaluate the wound base.     Wound Leg lower Distal;Right #1 (Active)   Wound Image   09/06/22 0909   Wound Etiology Venous 08/25/22 1306   Dressing Status Old drainage noted 09/06/22 0909   Cleansed Soap and water 09/06/22 0909   Dressing/Treatment Betadine swabs/Povidone Iodine;Gauze dressing/dressing sponge 08/18/22 1349   Wound Length (cm) 0 cm 09/15/22 1405   Wound Width (cm) 0 cm 09/15/22 1405   Wound Depth (cm) 0 cm 09/15/22 1405   Wound Surface Area (cm^2) 0 cm^2 09/15/22 1405   Change in Wound Size % 100 09/15/22 1405   Wound Volume (cm^3) 0 cm^3 09/15/22 1405   Wound Healing % 100 09/15/22 1405   Post-Procedure Length (cm) 0.8 cm 08/25/22 1336   Post-Procedure Width (cm) 0.4 cm 08/25/22 1336   Post-Procedure Depth (cm) 0.2 cm 08/25/22 1336   Post-Procedure Surface Area (cm^2) 0.32 cm^2 08/25/22 1336   Post-Procedure Volume (cm^3) 0.064 cm^3 08/25/22 1336   Wound Assessment Slough;[place]/red 09/06/22 0909   Drainage Amount Small 09/06/22 0909   Drainage Description Serous 09/06/22 0909   Wound Odor None 09/06/22 0909   Buffy-Wound/Incision Assessment Blanchable erythema 09/06/22 0909 Edges Flat/open edges 09/06/22 0909   Wound Thickness Description Partial thickness 09/06/22 0909   Number of days: 28       Wound Leg lower Right;Lateral;Proximal #2 (Active)   Wound Image   09/06/22 0909   Wound Etiology Venous 08/25/22 1306   Dressing Status Old drainage noted 08/25/22 1306   Cleansed Soap and water 08/25/22 1306   Dressing/Treatment Betadine swabs/Povidone Iodine;Gauze dressing/dressing sponge 08/18/22 1349   Wound Length (cm) 0 cm 09/15/22 1405   Wound Width (cm) 0 cm 09/15/22 1405   Wound Depth (cm) 0 cm 09/15/22 1405   Wound Surface Area (cm^2) 0 cm^2 09/15/22 1405   Change in Wound Size % 100 09/15/22 1405   Wound Volume (cm^3) 0 cm^3 09/15/22 1405   Wound Healing % 100 09/15/22 1405   Post-Procedure Length (cm) 1.5 cm 08/25/22 1336   Post-Procedure Width (cm) 0.8 cm 08/25/22 1336   Post-Procedure Depth (cm) 0.2 cm 08/25/22 1336   Post-Procedure Surface Area (cm^2) 1.2 cm^2 08/25/22 1336   Post-Procedure Volume (cm^3) 0.24 cm^3 08/25/22 1336   Wound Assessment Slough;Pink/red 08/25/22 1306   Drainage Amount None 09/06/22 0909   Drainage Description Serosanguinous 08/25/22 1306   Wound Odor None 09/06/22 0909   Buffy-Wound/Incision Assessment Blanchable erythema 09/06/22 0909   Edges Attached edges 09/06/22 0909   Wound Thickness Description Partial thickness 08/25/22 1306   Number of days: 28       Wound Leg lower Left;Lateral #3 Cluster (Active)   Wound Image   09/06/22 0909   Wound Etiology Venous 08/25/22 1306   Dressing Status Old drainage noted 09/06/22 0909   Cleansed Soap and water 08/25/22 1306   Dressing/Treatment Betadine swabs/Povidone Iodine;Gauze dressing/dressing sponge 08/18/22 1349   Wound Length (cm) 0 cm 09/15/22 1405   Wound Width (cm) 0 cm 09/15/22 1405   Wound Depth (cm) 0 cm 09/15/22 1405   Wound Surface Area (cm^2) 0 cm^2 09/15/22 1405   Change in Wound Size % 100 09/15/22 1405   Wound Volume (cm^3) 0 cm^3 09/15/22 1405   Wound Healing % 100 09/15/22 1405 Post-Procedure Length (cm) 7 cm 08/25/22 1336   Post-Procedure Width (cm) 4.2 cm 08/25/22 1336   Post-Procedure Depth (cm) 0.1 cm 08/25/22 1336   Post-Procedure Surface Area (cm^2) 29.4 cm^2 08/25/22 1336   Post-Procedure Volume (cm^3) 2.94 cm^3 08/25/22 1336   Wound Assessment Slough;Pink/red 08/25/22 1306   Drainage Amount None 09/06/22 0909   Drainage Description Serosanguinous 08/25/22 1306   Wound Odor None 09/06/22 0909   Buffy-Wound/Incision Assessment Blanchable erythema;Dry/flaky 09/06/22 0909   Edges Attached edges 09/06/22 0909   Wound Thickness Description Partial thickness 08/25/22 1306   Number of days: 28       Wound Leg lower Medial;Right #4 (Active)   Wound Image   09/15/22 1405   Dressing Status Old drainage noted 09/15/22 1405   Cleansed Soap and water 09/15/22 1405   Wound Length (cm) 0.5 cm 09/15/22 1405   Wound Width (cm) 0.4 cm 09/15/22 1405   Wound Depth (cm) 0.1 cm 09/15/22 1405   Wound Surface Area (cm^2) 0.2 cm^2 09/15/22 1405   Change in Wound Size % 93.75 09/15/22 1405   Wound Volume (cm^3) 0.02 cm^3 09/15/22 1405   Wound Healing % 97 09/15/22 1405   Post-Procedure Length (cm) 2 cm 08/25/22 1336   Post-Procedure Width (cm) 1.6 cm 08/25/22 1336   Post-Procedure Depth (cm) 0.3 cm 08/25/22 1336   Post-Procedure Surface Area (cm^2) 3.2 cm^2 08/25/22 1336   Post-Procedure Volume (cm^3) 0.96 cm^3 08/25/22 1336   Wound Assessment Slough;Pink/red 09/15/22 1405   Drainage Amount Small 09/15/22 1405   Drainage Description Serous 09/15/22 1405   Wound Odor None 09/15/22 1405   Buffy-Wound/Incision Assessment Blanchable erythema 09/15/22 1405   Edges Flat/open edges 09/15/22 1405   Number of days: 21       Wound Leg Anterior;Right #5 (Active)   Wound Image   09/15/22 1405   Dressing Status Old drainage noted 09/15/22 1405   Cleansed Soap and water 09/15/22 1405   Dressing/Treatment Other (Comment);Gauze dressing/dressing sponge 09/06/22 0935   Wound Length (cm) 0.1 cm 09/15/22 1405   Wound Width (cm) 0.1 cm 09/15/22 1405   Wound Depth (cm) 0.1 cm 09/15/22 1405   Wound Surface Area (cm^2) 0.01 cm^2 09/15/22 1405   Change in Wound Size % 99.73 09/15/22 1405   Wound Volume (cm^3) 0.001 cm^3 09/15/22 1405   Wound Healing % 100 09/15/22 1405   Wound Assessment Epithelialization 09/15/22 1405   Drainage Amount None 09/15/22 1405   Drainage Description Serous 09/06/22 0916   Wound Odor None 09/15/22 1405   Buffy-Wound/Incision Assessment Blanchable erythema 09/15/22 1405   Edges Attached edges 09/15/22 1405   Wound Thickness Description Partial thickness 09/06/22 0916   Number of days: 9       Wound Foot Anterior;Right;Dorsal #6 (Active)   Wound Image   09/15/22 1405   Wound Length (cm) 1.2 cm 09/15/22 1405   Wound Width (cm) 2.5 cm 09/15/22 1405   Wound Depth (cm) 0 cm 09/15/22 1405   Wound Surface Area (cm^2) 3 cm^2 09/15/22 1405   Wound Volume (cm^3) 0 cm^3 09/15/22 1405   Wound Assessment Slough;Pink/red 09/15/22 1405   Drainage Amount None 09/15/22 1405   Wound Odor None 09/15/22 1405   Buffy-Wound/Incision Assessment Blanchable erythema;Edematous 09/15/22 1405   Edges Attached edges 09/15/22 1405   Number of days: 0        Plan:   RTC as needed  Treatment Note please see attached Discharge Instructions    Written patient dismissal instructions given to patient or POA.          Electronically signed by Destinee Alvarado MD on 9/15/2022 at 2:31 PM

## 2022-09-15 NOTE — WOUND CARE
09/15/22 1405   Right Leg Edema Point of Measurement   Leg circumference 38.5 cm   Ankle circumference 25.6 cm   Compression Therapy 2 layer compression wrap   Left Leg Edema Point of Measurement   Leg circumference 40 cm   Ankle circumference 26.7 cm   Compression Therapy 2 layer compression wrap   LLE Peripheral Vascular    Capillary Refill Less than/equal to 3 seconds   Color Appropriate for race   Temperature Warm   Pedal Pulse Palpable   RLE Peripheral Vascular    Capillary Refill Less than/equal to 3 seconds   Color Appropriate for race   Temperature Warm   Pedal Pulse Palpable   Wound Foot Anterior;Right;Dorsal #6   Date First Assessed/Time First Assessed: 09/15/22 1413   Location: Foot  Wound Location Orientation: Anterior;Right;Dorsal  Wound Description: #6   Wound Image    Wound Length (cm) 1.2 cm   Wound Width (cm) 2.5 cm   Wound Depth (cm) 0 cm   Wound Surface Area (cm^2) 3 cm^2   Wound Volume (cm^3) 0 cm^3   Wound Assessment Slough;Pink/red   Drainage Amount None   Wound Odor None   Buffy-Wound/Incision Assessment Blanchable erythema;Edematous   Edges Attached edges   Wound Leg Anterior;Right #5   Date First Assessed/Time First Assessed: 09/06/22 0915   Present on Hospital Admission: Yes  Location: Leg  Wound Location Orientation: Anterior;Right  Wound Description: #5   Wound Image    Dressing Status Old drainage noted   Cleansed Soap and water   Wound Length (cm) 0.1 cm   Wound Width (cm) 0.1 cm   Wound Depth (cm) 0.1 cm   Wound Surface Area (cm^2) 0.01 cm^2   Change in Wound Size % 99.73   Wound Volume (cm^3) 0.001 cm^3   Wound Healing % 100   Wound Assessment Epithelialization   Drainage Amount None   Wound Odor None   Buffy-Wound/Incision Assessment Blanchable erythema   Edges Attached edges   Wound Leg lower Medial;Right #4   Date First Assessed/Time First Assessed: 08/25/22 1315   Present on Hospital Admission: Yes  Location: Leg lower  Wound Location Orientation: Medial;Right  Wound Description: #4   Wound Image    Dressing Status Old drainage noted   Cleansed Soap and water   Wound Length (cm) 0.5 cm   Wound Width (cm) 0.4 cm   Wound Depth (cm) 0.1 cm   Wound Surface Area (cm^2) 0.2 cm^2   Change in Wound Size % 93.75   Wound Volume (cm^3) 0.02 cm^3   Wound Healing % 97   Wound Assessment Slough;Pink/red   Drainage Amount Small   Drainage Description Serous   Wound Odor None   Buffy-Wound/Incision Assessment Blanchable erythema   Edges Flat/open edges   Wound Leg lower Distal;Right #1   Date First Assessed/Time First Assessed: 08/18/22 1305   Present on Hospital Admission: Yes  Location: Leg lower  Wound Location Orientation: Distal;Right  Wound Description: #1   Wound Length (cm) 0 cm   Wound Width (cm) 0 cm   Wound Depth (cm) 0 cm   Wound Surface Area (cm^2) 0 cm^2   Change in Wound Size % 100   Wound Volume (cm^3) 0 cm^3   Wound Healing % 100   Wound Leg lower Right;Lateral;Proximal #2   Date First Assessed/Time First Assessed: 08/18/22 1305   Present on Hospital Admission: Yes  Location: Leg lower  Wound Location Orientation: Right;Lateral;Proximal  Wound Description: #2   Wound Length (cm) 0 cm   Wound Width (cm) 0 cm   Wound Depth (cm) 0 cm   Wound Surface Area (cm^2) 0 cm^2   Change in Wound Size % 100   Wound Volume (cm^3) 0 cm^3   Wound Healing % 100   Wound Leg lower Left;Lateral #3 Cluster   Date First Assessed/Time First Assessed: 08/18/22 1306   Present on Hospital Admission: Yes  Location: Leg lower  Wound Location Orientation: Left;Lateral  Wound Description: #3 Cluster   Wound Length (cm) 0 cm   Wound Width (cm) 0 cm   Wound Depth (cm) 0 cm   Wound Surface Area (cm^2) 0 cm^2   Change in Wound Size % 100   Wound Volume (cm^3) 0 cm^3   Wound Healing % 100     Visit Vitals  BP (!) 160/72 (BP 1 Location: Right upper arm, BP Patient Position: Sitting)   Pulse 98   Temp 98.1 °F (36.7 °C)   Resp 19

## 2023-01-11 ENCOUNTER — APPOINTMENT (OUTPATIENT)
Dept: GENERAL RADIOLOGY | Age: 68
End: 2023-01-11
Attending: EMERGENCY MEDICINE
Payer: MEDICARE

## 2023-01-11 ENCOUNTER — HOSPITAL ENCOUNTER (EMERGENCY)
Age: 68
Discharge: HOME | End: 2023-01-11
Attending: EMERGENCY MEDICINE
Payer: MEDICARE

## 2023-01-11 ENCOUNTER — APPOINTMENT (OUTPATIENT)
Dept: CT IMAGING | Age: 68
End: 2023-01-11
Attending: EMERGENCY MEDICINE
Payer: MEDICARE

## 2023-01-11 VITALS
SYSTOLIC BLOOD PRESSURE: 135 MMHG | DIASTOLIC BLOOD PRESSURE: 53 MMHG | HEART RATE: 85 BPM | RESPIRATION RATE: 16 BRPM | HEIGHT: 66 IN | WEIGHT: 230 LBS | BODY MASS INDEX: 36.96 KG/M2 | OXYGEN SATURATION: 99 % | TEMPERATURE: 97.8 F

## 2023-01-11 DIAGNOSIS — M79.671 RIGHT FOOT PAIN: ICD-10-CM
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
## 2023-01-11 PROCEDURE — 70450 CT HEAD/BRAIN W/O DYE: CPT

## 2023-01-11 PROCEDURE — 74011250637 HC RX REV CODE- 250/637: Performed by: EMERGENCY MEDICINE

## 2023-01-11 PROCEDURE — 99285 EMERGENCY DEPT VISIT HI MDM: CPT

## 2023-01-11 PROCEDURE — 73630 X-RAY EXAM OF FOOT: CPT

## 2023-01-11 RX ORDER — OXYCODONE AND ACETAMINOPHEN 5; 325 MG/1; MG/1
1 TABLET ORAL
Status: COMPLETED | OUTPATIENT
Start: 2023-01-11 | End: 2023-01-11

## 2023-01-11 RX ADMIN — OXYCODONE HYDROCHLORIDE AND ACETAMINOPHEN 1 TABLET: 5; 325 TABLET ORAL at 18:19

## 2023-01-11 NOTE — ED PROVIDER NOTES
EMERGENCY DEPARTMENT HISTORY AND PHYSICAL EXAM      Date: 1/11/2023  Patient Name: Darius Kennedy    History of Presenting Illness     Chief Complaint   Patient presents with    Fall     History Provided By: Patient    HPI: Darius Kennedy, 78 y.o. female with past medical history of asthma, diabetes, and hypertension who presents with fall. States that she was walking up the steps. She tripped and fell forward hitting her head on the wall. States she also injured her right foot. She is having pain in her right foot that is constant and severe. Pain is worse with movement. She has no headache or neck pain. She does take warfarin. She does have erythema to her legs however states that this has been chronic. She does see the wound clinic. There are no other complaints, changes, or physical findings at this time. PCP: ALFREDO Thornton    Current Outpatient Medications   Medication Sig Dispense Refill    cefadroxil (DURICEF) 1 gram tablet Take 1 g by mouth two (2) times a day.      gabapentin (NEURONTIN) 300 mg capsule Take 300 mg by mouth nightly. Take 1 capsule by mouth every day at bedtime, and 1 capsule during the day as needed      amLODIPine-benazepril (LOTREL) 5-10 mg per capsule Take 1 Capsule by mouth daily. amLODIPine (NORVASC) 5 mg tablet Take 5 mg by mouth daily. oxybutynin (DITROPAN) 5 mg tablet Take 5 mg by mouth two (2) times a day. fluocinoNIDE (LIDEX) 0.05 % ointment Apply  to affected area two (2) times a day. 30 g 1    loratadine (CLARITIN) 10 mg tablet TAKE 1 TABLET BY MOUTH EVERY DAY 90 Tab 1    levothyroxine (SYNTHROID) 25 mcg tablet TAKE 1 TABLET BY MOUTH EVERY DAY (Patient taking differently: 50 mcg. TAKE 1 TABLET BY MOUTH EVERY DAY) 90 Tab 1    Jardiance 25 mg tablet TAKE 1 TABLET BY MOUTH EVERY DAY 90 Tab 1    busPIRone (BUSPAR) 5 mg tablet Take 1 Tab by mouth three (3) times daily.  Indications: repeated episodes of anxiety 270 Tab 1    DULoxetine (CYMBALTA) 20 mg capsule Take 1 Cap by mouth two (2) times a day. Indications: anxiousness associated with depression (Patient taking differently: Take 30 mg by mouth two (2) times a day. Indications: anxiousness associated with depression) 180 Cap 1    SITagliptin-metFORMIN (Janumet XR) 50-1,000 mg TM24 TAKE 1 TABLET BY MOUTH TWICE A  Tab 0    warfarin (COUMADIN) 2 mg tablet Take 6 mg by mouth daily. sotaloL (BETAPACE) 80 mg tablet Take 80 mg by mouth two (2) times a day. cyclobenzaprine (FLEXERIL) 10 mg tablet Take 10 mg by mouth three (3) times daily. clotrimazole (LOTRIMIN) 1 % topical cream Apply  to affected area two (2) times a day. On FEET 60 g 2    betamethasone dipropionate (DIPROSONE) 0.05 % topical cream Apply  to affected area two (2) times daily as needed for Skin Irritation. Of face 45 g 1    albuterol (PROVENTIL HFA, VENTOLIN HFA, PROAIR HFA) 90 mcg/actuation inhaler Take 2 Puffs by inhalation every four (4) hours as needed for Wheezing. Past History   Past Medical History:  Past Medical History:   Diagnosis Date    Allergies     Asthma     Cardiac arrhythmia     Diabetes ([address]     Hx of long term use of blood thinners     Hypertension     Thyroid disease         Past Surgical History:  Past Surgical History:   Procedure Laterality Date    HX  SECTION      HX GYN         Family History:  Family History   Problem Relation Age of Onset    Hypertension Other     Diabetes Other     Heart Disease Other        Social History:  Social History     Tobacco Use    Smoking status: Never    Smokeless tobacco: Never   Substance Use Topics    Alcohol use: No    Drug use: Never       Allergies: Allergies   Allergen Reactions    Cephalexin Unknown (comments)    Sulfa (Sulfonamide Antibiotics) Unknown (comments)    Zofran [Ondansetron Hcl] Unknown (comments)        Review of Systems   Review of Systems   Constitutional:  Negative for fever. HENT:  Negative for congestion. Eyes:  Negative for visual disturbance. Respiratory:  Negative for shortness of breath. Cardiovascular:  Negative for chest pain. Gastrointestinal:  Negative for abdominal pain. Genitourinary:  Negative for dysuria. Musculoskeletal:  Negative for arthralgias. Skin:  Negative for rash. Neurological:  Negative for headaches. Physical Exam   Constitutional: No acute distress. Well-nourished. Skin: No rash. ENT: No rhinorrhea. No cough. Head is normocephalic and atraumatic. No midline cervical spinal tenderness. Eye: No proptosis or conjunctival injections. Respiratory: No apparent respiratory distress. Gastrointestinal: Nondistended. Musculoskeletal: No obvious bony deformities. Tenderness to the dorsal aspect of the right foot. Patient has significant swelling to bilateral lower extremities with erythema. Psychiatric: Cooperative. Appropriate mood and affect. Lab and Diagnostic Study Results   Labs -   No results found for this or any previous visit (from the past 12 hour(s)). Radiologic Studies -   [unfilled]  CT Results  (Last 48 hours)                 01/11/23 1842  CT HEAD WO CONT Final result    Impression:  No acute intracranial abnormality. Narrative:  EXAM: CT HEAD WO CONT       INDICATION: fall, hit head       COMPARISON: CT February 2013. CONTRAST: None. TECHNIQUE: Unenhanced CT of the head was performed using 5 mm images. Brain and   bone windows were generated. Coronal and sagittal reformats. CT dose reduction   was achieved through use of a standardized protocol tailored for this   examination and automatic exposure control for dose modulation. FINDINGS:   The ventricles and sulci are normal in size, shape and configuration. There is   no significant white matter disease. There is no intracranial hemorrhage,   extra-axial collection, or mass effect. The basilar cisterns are open. No CT   evidence of acute infarct.        The bone windows demonstrate no abnormalities. The visualized portions of the   paranasal sinuses and mastoid air cells are clear. CXR Results  (Last 48 hours)      None            Medical Decision Making and ED Course   - I am the first and primary provider for this patient AND AM THE PRIMARY PROVIDER OF RECORD. I reviewed the vital signs, available nursing notes, past medical history, past surgical history, family history and social history. - Initial assessment performed. The patients presenting problems have been discussed, and the staff are in agreement with the care plan formulated and outlined with them. I have encouraged them to ask questions as they arise throughout their visit. Vital Signs-Reviewed the patient's vital signs. Patient Vitals for the past 12 hrs:   Temp Pulse Resp BP SpO2   01/11/23 1755 97.8 °F (36.6 °C) 85 16 (!) 135/53 99 %     MDM  Differential diagnosis: Fall, concussion, neck injury, intracranial hemorrhage, foot fracture, foot contusion    Presents with foot pain. She fell and hit her head as well. She is on warfarin. Due to this she was a trauma bravo. CT head is ordered. Have also ordered an x-ray of the right foot. Patient given 5-325 mg percocet for pain. Imaging completed of her head shows no intracranial hemorrhage or fracture. X-ray shows no fracture. Patient is able to bear weight. She is asking for crutches. I think this is appropriate. I will have her following up with her PCP tomorrow. Procedures and Critical Care     Critical Care:   CRITICAL CARE:  I personally spent a total of 40 minutes of critical care time in obtaining history, performing a physical exam, bedside monitoring of interventions, collecting and interpreting tests but excluding time spent performing procedures, treating other patients and teaching time. Discussion with consultant: n/a. Clinical Concern: Fall with head injury on warfarin.    Intervention: CT head  Colleen Muse Bren Sousa. Disposition     Disposition:  Discharged home    DISCHARGE PLAN:  1. Current Discharge Medication List        CONTINUE these medications which have NOT CHANGED    Details   cefadroxil (DURICEF) 1 gram tablet Take 1 g by mouth two (2) times a day.      gabapentin (NEURONTIN) 300 mg capsule Take 300 mg by mouth nightly. Take 1 capsule by mouth every day at bedtime, and 1 capsule during the day as needed      amLODIPine-benazepril (LOTREL) 5-10 mg per capsule Take 1 Capsule by mouth daily. amLODIPine (NORVASC) 5 mg tablet Take 5 mg by mouth daily. oxybutynin (DITROPAN) 5 mg tablet Take 5 mg by mouth two (2) times a day. fluocinoNIDE (LIDEX) 0.05 % ointment Apply  to affected area two (2) times a day. Qty: 30 g, Refills: 1      loratadine (CLARITIN) 10 mg tablet TAKE 1 TABLET BY MOUTH EVERY DAY  Qty: 90 Tab, Refills: 1    Associated Diagnoses: Other seasonal allergic rhinitis      levothyroxine (SYNTHROID) 25 mcg tablet TAKE 1 TABLET BY MOUTH EVERY DAY  Qty: 90 Tab, Refills: 1      Jardiance 25 mg tablet TAKE 1 TABLET BY MOUTH EVERY DAY  Qty: 90 Tab, Refills: 1    Associated Diagnoses: Type 2 diabetes mellitus with diabetic neuropathy, without long-term current use of insulin ([facility])      busPIRone (BUSPAR) 5 mg tablet Take 1 Tab by mouth three (3) times daily. Indications: repeated episodes of anxiety  Qty: 270 Tab, Refills: 1      DULoxetine (CYMBALTA) 20 mg capsule Take 1 Cap by mouth two (2) times a day. Indications: anxiousness associated with depression  Qty: 180 Cap, Refills: 1      SITagliptin-metFORMIN (Janumet XR) 50-1,000 mg TM24 TAKE 1 TABLET BY MOUTH TWICE A DAY  Qty: 180 Tab, Refills: 0      warfarin (COUMADIN) 2 mg tablet Take 6 mg by mouth daily. sotaloL (BETAPACE) 80 mg tablet Take 80 mg by mouth two (2) times a day. cyclobenzaprine (FLEXERIL) 10 mg tablet Take 10 mg by mouth three (3) times daily.       clotrimazole (LOTRIMIN) 1 % topical cream Apply  to affected area two (2) times a day. On FEET  Qty: 60 g, Refills: 2      betamethasone dipropionate (DIPROSONE) 0.05 % topical cream Apply  to affected area two (2) times daily as needed for Skin Irritation. Of face  Qty: 45 g, Refills: 1      albuterol (PROVENTIL HFA, VENTOLIN HFA, PROAIR HFA) 90 mcg/actuation inhaler Take 2 Puffs by inhalation every four (4) hours as needed for Wheezing. 2.   Follow-up Information       Follow up With Specialties Details Why [address] EMERGENCY DEPT Emergency Medicine Go today As soon as possible if symptoms worsen [address] [address]  [phone]    Primary care doctor  Schedule an appointment as soon as possible for a visit in 3 days            3. Return to ED if worse   4. Current Discharge Medication List           Diagnosis/Clinical Impression     Clinical Impression:   1. Fall, initial encounter    2. Right foot pain       Attestations:  Dai Argue, DO    Please note that this dictation was completed with [employer], the computer voice recognition software. Quite often unanticipated grammatical, syntax, homophones, and other interpretive errors are inadvertently transcribed by the computer software. Please disregard these errors. Please excuse any errors that have escaped final proofreading. Thank you.

## 2023-01-11 NOTE — ED TRIAGE NOTES
Patient arrives from home for GLF walking upstaris she fell and right foot pain, patient states she hit her head and she is on coumadin, bravo alerted at this time

## 2023-02-06 ENCOUNTER — HOSPITAL ENCOUNTER (EMERGENCY)
Age: 68
Discharge: HOME | End: 2023-02-07
Attending: EMERGENCY MEDICINE
Payer: MEDICARE

## 2023-02-06 VITALS
OXYGEN SATURATION: 97 % | TEMPERATURE: 98.5 F | RESPIRATION RATE: 18 BRPM | HEART RATE: 101 BPM | SYSTOLIC BLOOD PRESSURE: 158 MMHG | DIASTOLIC BLOOD PRESSURE: 80 MMHG

## 2023-02-06 DIAGNOSIS — N30.90 CYSTITIS: ICD-10-CM

## 2023-02-06 DIAGNOSIS — R73.9 HYPERGLYCEMIA: Primary | ICD-10-CM

## 2023-02-06 LAB
ALBUMIN SERPL-MCNC: 3 G/DL (ref 3.5–5)
ALBUMIN/GLOB SERPL: 0.6 (ref 1.1–2.2)
ALP SERPL-CCNC: 130 U/L (ref 45–117)
ALT SERPL-CCNC: 18 U/L (ref 12–78)
ANION GAP SERPL CALC-SCNC: 5 MMOL/L (ref 5–15)
APPEARANCE UR: ABNORMAL
AST SERPL W P-5'-P-CCNC: 40 U/L (ref 15–37)
BACTERIA URNS QL MICRO: NEGATIVE /HPF
BASOPHILS # BLD: 0.1 K/UL (ref 0–0.1)
BASOPHILS NFR BLD: 1 % (ref 0–1)
BILIRUB SERPL-MCNC: 0.8 MG/DL (ref 0.2–1)
BILIRUB UR QL: NEGATIVE
BUN SERPL-MCNC: 13 MG/DL (ref 6–20)
BUN/CREAT SERPL: 11 (ref 12–20)
CA-I BLD-MCNC: 8.9 MG/DL (ref 8.5–10.1)
CHLORIDE SERPL-SCNC: 99 MMOL/L (ref 97–108)
CO2 SERPL-SCNC: 27 MMOL/L (ref 21–32)
COLOR UR: ABNORMAL
CREAT SERPL-MCNC: 1.15 MG/DL (ref 0.55–1.02)
DIFFERENTIAL METHOD BLD: ABNORMAL
EOSINOPHIL # BLD: 0 K/UL (ref 0–0.4)
EOSINOPHIL NFR BLD: 0 % (ref 0–7)
EPITH CASTS URNS QL MICRO: ABNORMAL /LPF
ERYTHROCYTE [DISTWIDTH] IN BLOOD BY AUTOMATED COUNT: 15.8 % (ref 11.5–14.5)
GLOBULIN SER CALC-MCNC: 5.2 G/DL (ref 2–4)
GLUCOSE BLD STRIP.AUTO-MCNC: 432 MG/DL (ref 65–100)
GLUCOSE SERPL-MCNC: 435 MG/DL (ref 65–100)
GLUCOSE UR STRIP.AUTO-MCNC: >1000 MG/DL
HCT VFR BLD AUTO: 39.6 % (ref 35–47)
HGB BLD-MCNC: 12.8 G/DL (ref 11.5–16)
HGB UR QL STRIP: ABNORMAL
IMM GRANULOCYTES # BLD AUTO: 0 K/UL (ref 0–0.04)
IMM GRANULOCYTES NFR BLD AUTO: 0 % (ref 0–0.5)
KETONES UR QL STRIP.AUTO: 15 MG/DL
LEUKOCYTE ESTERASE UR QL STRIP.AUTO: ABNORMAL
LYMPHOCYTES # BLD: 1.9 K/UL (ref 0.8–3.5)
LYMPHOCYTES NFR BLD: 22 % (ref 12–49)
MCH RBC QN AUTO: 27.7 PG (ref 26–34)
MCHC RBC AUTO-ENTMCNC: 32.3 G/DL (ref 30–36.5)
MCV RBC AUTO: 85.7 FL (ref 80–99)
MONOCYTES # BLD: 0.8 K/UL (ref 0–1)
MONOCYTES NFR BLD: 9 % (ref 5–13)
NEUTS SEG # BLD: 5.9 K/UL (ref 1.8–8)
NEUTS SEG NFR BLD: 68 % (ref 32–75)
NITRITE UR QL STRIP.AUTO: NEGATIVE
NRBC # BLD: 0 K/UL (ref 0–0.01)
NRBC BLD-RTO: 0 PER 100 WBC
PERFORMED BY, TECHID: ABNORMAL
PH UR STRIP: 5
PLATELET # BLD AUTO: 245 K/UL (ref 150–400)
PMV BLD AUTO: 11.7 FL (ref 8.9–12.9)
POTASSIUM SERPL-SCNC: 5.4 MMOL/L (ref 3.5–5.1)
PROT SERPL-MCNC: 8.2 G/DL (ref 6.4–8.2)
PROT UR STRIP-MCNC: 100 MG/DL
RBC # BLD AUTO: 4.62 M/UL (ref 3.8–5.2)
RBC #/AREA URNS HPF: >100 /HPF (ref 0–5)
SODIUM SERPL-SCNC: 131 MMOL/L (ref 136–145)
SP GR UR REFRACTOMETRY: 1.01 (ref 1–1.03)
UA: UC IF INDICATED,UAUC: ABNORMAL
UROBILINOGEN UR QL STRIP.AUTO: 0.1 EU/DL (ref 0.2–1)
WBC # BLD AUTO: 8.7 K/UL (ref 3.6–11)
WBC URNS QL MICRO: >100 /HPF (ref 0–4)

## 2023-02-06 PROCEDURE — 87077 CULTURE AEROBIC IDENTIFY: CPT

## 2023-02-06 PROCEDURE — 85025 COMPLETE CBC W/AUTO DIFF WBC: CPT

## 2023-02-06 PROCEDURE — 87186 SC STD MICRODIL/AGAR DIL: CPT

## 2023-02-06 PROCEDURE — 74011636637 HC RX REV CODE- 636/637: Performed by: EMERGENCY MEDICINE

## 2023-02-06 PROCEDURE — 80053 COMPREHEN METABOLIC PANEL: CPT

## 2023-02-06 PROCEDURE — 87086 URINE CULTURE/COLONY COUNT: CPT

## 2023-02-06 PROCEDURE — 81001 URINALYSIS AUTO W/SCOPE: CPT

## 2023-02-06 PROCEDURE — 99284 EMERGENCY DEPT VISIT MOD MDM: CPT

## 2023-02-06 PROCEDURE — 82962 GLUCOSE BLOOD TEST: CPT

## 2023-02-06 RX ADMIN — INSULIN HUMAN 10 UNITS: 100 INJECTION, SOLUTION PARENTERAL at 23:59

## 2023-02-07 LAB
GLUCOSE BLD STRIP.AUTO-MCNC: 331 MG/DL (ref 65–100)
GLUCOSE BLD STRIP.AUTO-MCNC: 397 MG/DL (ref 65–100)
PERFORMED BY, TECHID: ABNORMAL
PERFORMED BY, TECHID: ABNORMAL

## 2023-02-07 PROCEDURE — 82962 GLUCOSE BLOOD TEST: CPT

## 2023-02-07 PROCEDURE — 74011250637 HC RX REV CODE- 250/637: Performed by: STUDENT IN AN ORGANIZED HEALTH CARE EDUCATION/TRAINING PROGRAM

## 2023-02-07 RX ORDER — DOXYCYCLINE 100 MG/1
100 CAPSULE ORAL
Status: COMPLETED | OUTPATIENT
Start: 2023-02-07 | End: 2023-02-07

## 2023-02-07 RX ORDER — DOXYCYCLINE HYCLATE 100 MG
100 TABLET ORAL 2 TIMES DAILY
Qty: 14 TABLET | Refills: 0 | Status: SHIPPED | OUTPATIENT
Start: 2023-02-07 | End: 2023-02-14

## 2023-02-07 RX ORDER — NAPROXEN 500 MG/1
500 TABLET ORAL
Qty: 20 TABLET | Refills: 0 | Status: SHIPPED | OUTPATIENT
Start: 2023-02-07

## 2023-02-07 RX ORDER — SODIUM CHLORIDE 9 MG/ML
50 INJECTION, SOLUTION INTRAVENOUS CONTINUOUS
Status: DISCONTINUED | OUTPATIENT
Start: 2023-02-07 | End: 2023-02-07

## 2023-02-07 RX ADMIN — DOXYCYCLINE HYCLATE 100 MG: 100 CAPSULE ORAL at 00:57

## 2023-02-07 NOTE — ED TRIAGE NOTES
Patient called EMS for shortness of breath, on arrival there was no complaint of such but she complained of leg pain and blood in urine, glucose was 401 with EMS.

## 2023-02-09 LAB
BACTERIA SPEC CULT: ABNORMAL
COLONY COUNT,CNT: ABNORMAL
SPECIAL REQUESTS,SREQ: ABNORMAL

## 2023-02-26 ENCOUNTER — APPOINTMENT (OUTPATIENT)
Dept: CT IMAGING | Age: 68
End: 2023-02-26
Attending: STUDENT IN AN ORGANIZED HEALTH CARE EDUCATION/TRAINING PROGRAM
Payer: MEDICARE

## 2023-02-26 ENCOUNTER — HOSPITAL ENCOUNTER (EMERGENCY)
Age: 68
Discharge: HOME | End: 2023-02-26
Attending: STUDENT IN AN ORGANIZED HEALTH CARE EDUCATION/TRAINING PROGRAM | Admitting: STUDENT IN AN ORGANIZED HEALTH CARE EDUCATION/TRAINING PROGRAM
Payer: MEDICARE

## 2023-02-26 VITALS
OXYGEN SATURATION: 99 % | RESPIRATION RATE: 18 BRPM | WEIGHT: 230 LBS | DIASTOLIC BLOOD PRESSURE: 85 MMHG | HEIGHT: 66 IN | BODY MASS INDEX: 36.96 KG/M2 | TEMPERATURE: 98.1 F | SYSTOLIC BLOOD PRESSURE: 172 MMHG | HEART RATE: 88 BPM
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
## 2023-02-26 DIAGNOSIS — S09.90XA INJURY OF HEAD, INITIAL ENCOUNTER: Primary | ICD-10-CM

## 2023-02-26 PROCEDURE — 74011250637 HC RX REV CODE- 250/637: Performed by: STUDENT IN AN ORGANIZED HEALTH CARE EDUCATION/TRAINING PROGRAM

## 2023-02-26 PROCEDURE — 72125 CT NECK SPINE W/O DYE: CPT

## 2023-02-26 PROCEDURE — 70450 CT HEAD/BRAIN W/O DYE: CPT

## 2023-02-26 PROCEDURE — 72128 CT CHEST SPINE W/O DYE: CPT

## 2023-02-26 PROCEDURE — 99284 EMERGENCY DEPT VISIT MOD MDM: CPT

## 2023-02-26 RX ORDER — METHOCARBAMOL 500 MG/1
500 TABLET, FILM COATED ORAL 2 TIMES DAILY
Qty: 20 TABLET | Refills: 0 | Status: SHIPPED | OUTPATIENT
Start: 2023-02-26 | End: 2023-03-08

## 2023-02-26 RX ORDER — MORPHINE SULFATE 15 MG/1
15 TABLET ORAL
Status: COMPLETED | OUTPATIENT
Start: 2023-02-26 | End: 2023-02-26

## 2023-02-26 RX ORDER — METHOCARBAMOL 500 MG/1
500 TABLET, FILM COATED ORAL 2 TIMES DAILY
Qty: 6 TABLET | Refills: 0 | Status: SHIPPED | OUTPATIENT
Start: 2023-02-26 | End: 2023-02-26

## 2023-02-26 RX ADMIN — MORPHINE SULFATE 15 MG: 15 TABLET ORAL at 17:17

## 2023-02-26 NOTE — ED TRIAGE NOTES
walking down front walkway when mis-stepped and fell backwards. Reports back pain, neck pain, head pain. No loc. Arrives with ccollar in place. + PMS.

## 2023-02-26 NOTE — ED NOTES
Airway: Patent, Managing oral secretions, and No obstruction    Respiratory: Normal Rate , Normal Depth, and Speaking in full sentences    Cardiac: WNL    Neuro: AVPU alert and A&O4/4    GI: Soft and Non-tender    : WNL    Muscle/Skeletal: Reports upper back pain radiating to shoulders. Pt also reports pain in neck and head. Denies loc.  Fall from walkway onto grass

## 2023-02-26 NOTE — DISCHARGE INSTRUCTIONS
Thank you! Thank you for allowing me to care for you in the emergency department. I sincerely hope that you are satisfied with your visit today. It is my goal to provide you with excellent care. Below you will find a list of your labs and imaging from your visit today if applicable. Should you have any questions regarding these results please do not hesitate to call the emergency department. Please review [facility] for a more detailed result list since the below list may not be comprehensive. Instructions on how to sign up to [facility] should be provided in this packet. Labs -   No results found for this or any previous visit (from the past 12 hour(s)). Radiologic Studies -   CT HEAD WO CONT   Final Result         No acute abnormality      CT SPINE CERV WO CONT   Final Result   1. No fracture. 2. C5-C6 moderate central spinal canal stenosis. 3. No significant stenosis in the thoracic spine. CT SPINE [facility] WO CONT   Final Result   1. No fracture. 2. C5-C6 moderate central spinal canal stenosis. 3. No significant stenosis in the thoracic spine. CT Results  (Last 48 hours)                 02/26/23 1621  CT HEAD WO CONT Final result    Impression:          No acute abnormality       Narrative:  EXAM: CT HEAD WO CONT       INDICATION: fall head pain       COMPARISON: 1/11/2023. CONTRAST: None. TECHNIQUE: Unenhanced CT of the head was performed using 5 mm images. Brain and   bone windows were generated. Coronal and sagittal reformats. CT dose reduction   was achieved through use of a standardized protocol tailored for this   examination and automatic exposure control for dose modulation. FINDINGS:   The ventricles and sulci are normal in size, shape and configuration. There is   no significant white matter disease. There is no intracranial hemorrhage,   extra-axial collection, or mass effect. The basilar cisterns are open. No CT   evidence of acute infarct. The bone windows demonstrate no abnormalities. The visualized portions of the   paranasal sinuses and mastoid air cells are clear. 02/26/23 1621  CT SPINE CERV WO CONT Final result    Impression:  1. No fracture. 2. C5-C6 moderate central spinal canal stenosis. 3. No significant stenosis in the thoracic spine. Narrative:  EXAM:  CT SPINE CERV WO CONT, CT SPINE THORAC WO CONT    INDICATION: Neck pain and thoracic back pain. Ground-level fall onto grass. .   COMPARISON: None. TECHNIQUE:    Multislice helical CT of the cervical and thoracic spine (2 separate studies   reported together) . Sagittal and coronal reformations were generated. CT dose   reduction was achieved through use of a standardized protocol tailored for this   examination and automatic exposure control for dose modulation. FINDINGS:   There is subtle right curvature of the cervical spine and subtle leftward   curvature of the upper thoracic spine. No subluxation. No acute fracture or   compression deformity. Moderate degenerative disc disease at C5-6 and C6-7. There is no fracture or other acute abnormality. C5-C6 moderate central spinal canal stenosis. C6-C7 mild central spinal canal stenosis       No significant stenosis in the thoracic spine. 02/26/23 1621  CT SPINE THORAC WO CONT Final result    Impression:  1. No fracture. 2. C5-C6 moderate central spinal canal stenosis. 3. No significant stenosis in the thoracic spine. Narrative:  EXAM:  CT SPINE CERV WO CONT, CT SPINE THORAC WO CONT    INDICATION: Neck pain and thoracic back pain. Ground-level fall onto grass. .   COMPARISON: None. TECHNIQUE:    Multislice helical CT of the cervical and thoracic spine (2 separate studies   reported together) . Sagittal and coronal reformations were generated.  CT dose   reduction was achieved through use of a standardized protocol tailored for this   examination and automatic exposure control for dose modulation. FINDINGS:   There is subtle right curvature of the cervical spine and subtle leftward   curvature of the upper thoracic spine. No subluxation. No acute fracture or   compression deformity. Moderate degenerative disc disease at C5-6 and C6-7. There is no fracture or other acute abnormality. C5-C6 moderate central spinal canal stenosis. C6-C7 mild central spinal canal stenosis       No significant stenosis in the thoracic spine. CXR Results  (Last 48 hours)      None               If you feel that you have not received excellent quality care or timely care, please ask to speak to the nurse manager. Please choose us in the future for your continued health care needs. ------------------------------------------------------------------------------------------------------------  The exam and treatment you received in the Emergency Department were for an urgent problem and are not intended as complete care. It is very important that you follow-up with a doctor, nurse practitioner, or physician assistant in a timely manner to:  (1) confirm your diagnosis and review all imaging and lab results,  (2) re-evaluation of changes in your illness and treatment, and  (3) for ongoing care. If your symptoms become worse or you do not improve as expected and you are unable to reach your usual health care provider, you should return to the Emergency Department. We are available 24 hours a day. Please take your discharge instructions with you when you go to your follow-up appointment. If a prescription has been provided, please have it filled as soon as possible to prevent a delay in treatment. Read the entire medication instruction sheet provided to you by the pharmacy.  If you have any questions or reservations about taking the medication due to side effects or interactions with other medications, please call your primary care physician or contact the ER to speak with the charge nurse. Make an appointment with your family doctor or the physician you were referred to for follow-up of this visit as instructed on your discharge paperwork, as this is a mandatory follow-up. Return to the ER if you are unable to be seen or if you are unable to be seen in a timely manner. If you have any problem arranging the follow-up visit, contact the Emergency Department immediately.

## 2023-02-27 NOTE — ED PROVIDER NOTES
[facility] EMERGENCY DEPT  EMERGENCY DEPARTMENT HISTORY AND PHYSICAL EXAM      Date: 2023  Patient Name: Aly Escobar  MRN: [id number]  YOB: 1955  Date of evaluation: 2023  Provider: Madelyn Sanchez MD   Note Started: 7:37 PM 23    HISTORY OF PRESENT ILLNESS     Chief Complaint   Patient presents with    Fall       History Provided By: Patient    HPI: Aly Escobra, 78 y.o. female with past history as reported below, on warfarin who reports to the ED for fall. Patient states she was walking down her steps when she slipped and fell backwards into a patch of grass, she notes she did hit her head but denies any loss of consciousness. She did not feel any blood, EMS was called at that time and the patient was transported to the ED. Patient is complaining of head pain neck pain and upper thoracic pain. Patient denies any extremity pain abdominal pain, chest pain, shortness of breath. She notes it was a mechanical fall and she remembers the entire incident. PAST MEDICAL HISTORY   Past Medical History:  Past Medical History:   Diagnosis Date    Allergies     Asthma     Cardiac arrhythmia     Diabetes ([address]     Hx of long term use of blood thinners     Hypertension     Thyroid disease        Past Surgical History:  Past Surgical History:   Procedure Laterality Date    HX  SECTION      HX GYN         Family History:  Family History   Problem Relation Age of Onset    Hypertension Other     Diabetes Other     Heart Disease Other        Social History:  Social History     Tobacco Use    Smoking status: Never    Smokeless tobacco: Never   Substance Use Topics    Alcohol use: No    Drug use: Never       Allergies:   Allergies   Allergen Reactions    Cephalexin Unknown (comments)    Sulfa (Sulfonamide Antibiotics) Unknown (comments)    Zofran [Ondansetron Hcl] Unknown (comments)       PCP: Deann Burnett FNP-C    Current Meds:   Previous Medications    ALBUTEROL (PROVENTIL HFA, VENTOLIN HFA, PROAIR HFA) 90 MCG/ACTUATION INHALER    Take 2 Puffs by inhalation every four (4) hours as needed for Wheezing. AMLODIPINE (NORVASC) 5 MG TABLET    Take 5 mg by mouth daily. AMLODIPINE-BENAZEPRIL (LOTREL) 5-10 MG PER CAPSULE    Take 1 Capsule by mouth daily. BETAMETHASONE DIPROPIONATE (DIPROSONE) 0.05 % TOPICAL CREAM    Apply  to affected area two (2) times daily as needed for Skin Irritation. Of face    BUSPIRONE (BUSPAR) 5 MG TABLET    Take 1 Tab by mouth three (3) times daily. Indications: repeated episodes of anxiety    CEFADROXIL (DURICEF) 1 GRAM TABLET    Take 1 g by mouth two (2) times a day. CLOTRIMAZOLE (LOTRIMIN) 1 % TOPICAL CREAM    Apply  to affected area two (2) times a day. On FEET    DULOXETINE (CYMBALTA) 20 MG CAPSULE    Take 1 Cap by mouth two (2) times a day. Indications: anxiousness associated with depression    FLUOCINONIDE (LIDEX) 0.05 % OINTMENT    Apply  to affected area two (2) times a day. GABAPENTIN (NEURONTIN) 300 MG CAPSULE    Take 300 mg by mouth nightly. Take 1 capsule by mouth every day at bedtime, and 1 capsule during the day as needed    JARDIANCE 25 MG TABLET    TAKE 1 TABLET BY MOUTH EVERY DAY    LEVOTHYROXINE (SYNTHROID) 25 MCG TABLET    TAKE 1 TABLET BY MOUTH EVERY DAY    LORATADINE (CLARITIN) 10 MG TABLET    TAKE 1 TABLET BY MOUTH EVERY DAY    NAPROXEN (NAPROSYN) 500 MG TABLET    Take 1 Tablet by mouth every twelve (12) hours as needed for Pain. OXYBUTYNIN (DITROPAN) 5 MG TABLET    Take 5 mg by mouth two (2) times a day. SITAGLIPTIN-METFORMIN (JANUMET XR) 50-1,000 MG TM24    TAKE 1 TABLET BY MOUTH TWICE A DAY    SOTALOL (BETAPACE) 80 MG TABLET    Take 80 mg by mouth two (2) times a day. WARFARIN (COUMADIN) 2 MG TABLET    Take 6 mg by mouth daily. REVIEW OF SYSTEMS   Review of Systems  Positives and Pertinent negatives as per HPI.     PHYSICAL EXAM     ED Triage Vitals   ED Encounter Vitals Group      BP 02/26/23 1525 (!) 167/86 Pulse (Heart Rate) 02/26/23 1532 91      Resp Rate 02/26/23 1525 18      Temp 02/26/23 1525 98.1 °F (36.7 °C)      Temp src --       O2 Sat (%) 02/26/23 1532 98 %      Weight 02/26/23 1525 230 lb      Height 02/26/23 1525 5' 6\"      Physical Exam  Vitals and nursing note reviewed. Constitutional:       General: She is not in acute distress. Appearance: Normal appearance. HENT:      Head: Normocephalic. Eyes:      Extraocular Movements: Extraocular movements intact. Pupils: Pupils are equal, round, and reactive to light. Neck:      Comments: C-collar present  Cardiovascular:      Rate and Rhythm: Normal rate. Pulmonary:      Effort: Pulmonary effort is normal.      Breath sounds: Normal breath sounds. Abdominal:      General: Abdomen is flat. There is no distension. Tenderness: There is no abdominal tenderness. There is no guarding or rebound. Musculoskeletal:         General: No swelling, deformity or signs of injury. Normal range of motion. Cervical back: Neck supple. Tenderness present. Right lower leg: No edema. Left lower leg: No edema. Comments: There is midline thoracic spine tenderness without step-off   Skin:     General: Skin is warm. Neurological:      General: No focal deficit present. Mental Status: She is alert and oriented to person, place, and time. ED COURSE and DIFFERENTIAL DIAGNOSIS/MDM   Records Reviewed (source and summary of external notes): Prior medical records    Vitals:    Vitals:    02/26/23 1601 02/26/23 1731 02/26/23 1800 02/26/23 1831   BP: (!) 182/97 (!) 172/87 (!) 172/85    Pulse:   88    Resp:   18    Temp:       SpO2: 98%  98% 99%   Weight:       Height:           CC/HPI Summary, DDx, ED Course, and Reassessment: 30-year-old female presenting to the ED for fall. Differential diagnosis includes head injury, contusion, less likely intracranial process, C-spine fracture, thoracic fracture, back contusion.   Will obtain CT imaging and then reassess. Morphine provided for pain. No indication for labs at this time as the patient is otherwise well-appearing. CT is resulted unremarkable for acute process. Morphine improved the pain. Patient was able to ambulate in the ED without difficulty. We will send low-dose vaccine to pharmacy. Patient was given the following medications:  Medications   morphine IR (MS IR) tablet 15 mg (15 mg Oral Given 2/26/23 1717)       CONSULTS: (Who and What was discussed)  None     Chronic Conditions:   Social Determinants affecting Dx or Tx:   Counseling:      Disposition Considerations (Tests not done, Shared Decision Making, Pt Expectation of Test or Treatment.): Understanding was insured that at this time there is no evidence for a more malignant underlying process, but that early in the process of an illness, an emergency department workup can be falsely reassuring. Routine discharge counseling was given including the fact that any worsening, changing or persistent symptoms should prompt an immediate call or follow up with their primary physician or the emergency department. The importance of appropriate follow up was also discussed. More extensive discharge instructions were given in the patient's discharge paperwork. After completion of evaluation and discussion of results and diagnoses, all the questions were answered. If required, all follow up appointments and treatments were discussed and explained. Understanding was insured prior to discharge. SCREENINGS               No data recorded        LAB, EKG AND DIAGNOSTIC RESULTS   Labs:  No results found for this or any previous visit (from the past 12 hour(s)).     Radiologic Studies:  Non-plain film images such as CT, Ultrasound and MRI are read by the radiologist. Plain radiographic images are visualized and preliminarily interpreted by the ED Provider with the below findings:      Interpretation per the Radiologist below, if available at the time of this note:  CT HEAD WO CONT    Result Date: 2/26/2023  EXAM: CT HEAD WO CONT INDICATION: fall head pain COMPARISON: 1/11/2023. CONTRAST: None. TECHNIQUE: Unenhanced CT of the head was performed using 5 mm images. Brain and bone windows were generated. Coronal and sagittal reformats. CT dose reduction was achieved through use of a standardized protocol tailored for this examination and automatic exposure control for dose modulation. FINDINGS: The ventricles and sulci are normal in size, shape and configuration. There is no significant white matter disease. There is no intracranial hemorrhage, extra-axial collection, or mass effect. The basilar cisterns are open. No CT evidence of acute infarct. The bone windows demonstrate no abnormalities. The visualized portions of the paranasal sinuses and mastoid air cells are clear. No acute abnormality    CT SPINE CERV WO CONT    Result Date: 2/26/2023  EXAM:  CT SPINE CERV WO CONT, CT SPINE [facility] WO CONT INDICATION: Neck pain and thoracic back pain. Ground-level fall onto grass. . COMPARISON: None. TECHNIQUE: Multislice helical CT of the cervical and thoracic spine (2 separate studies reported together) . Sagittal and coronal reformations were generated. CT dose reduction was achieved through use of a standardized protocol tailored for this examination and automatic exposure control for dose modulation. FINDINGS: There is subtle right curvature of the cervical spine and subtle leftward curvature of the upper thoracic spine. No subluxation. No acute fracture or compression deformity. Moderate degenerative disc disease at C5-6 and C6-7. There is no fracture or other acute abnormality. C5-C6 moderate central spinal canal stenosis. C6-C7 mild central spinal canal stenosis No significant stenosis in the thoracic spine. 1. No fracture. 2. C5-C6 moderate central spinal canal stenosis. 3. No significant stenosis in the thoracic spine.      CT SPINE [facility] CONT    Result Date: 2/26/2023  EXAM:  CT SPINE CERV WO CONT, CT SPINE [facility] WO CONT INDICATION: Neck pain and thoracic back pain. Ground-level fall onto grass. . COMPARISON: None. TECHNIQUE: Multislice helical CT of the cervical and thoracic spine (2 separate studies reported together) . Sagittal and coronal reformations were generated. CT dose reduction was achieved through use of a standardized protocol tailored for this examination and automatic exposure control for dose modulation. FINDINGS: There is subtle right curvature of the cervical spine and subtle leftward curvature of the upper thoracic spine. No subluxation. No acute fracture or compression deformity. Moderate degenerative disc disease at C5-6 and C6-7. There is no fracture or other acute abnormality. C5-C6 moderate central spinal canal stenosis. C6-C7 mild central spinal canal stenosis No significant stenosis in the thoracic spine. 1. No fracture. 2. C5-C6 moderate central spinal canal stenosis. 3. No significant stenosis in the thoracic spine. PROCEDURES   Unless otherwise noted below, none. Performed by: Amparo Dinero MD   Procedures      CRITICAL CARE TIME       FINAL IMPRESSION     1. Injury of head, initial encounter    2. Fall, initial encounter          DISPOSITION/PLAN   Discharged    Discharge Note: The patient is stable for discharge home. The signs, symptoms, diagnosis, and discharge instructions have been discussed, understanding conveyed, and agreed upon. The patient is to follow up as recommended or return to ER should their symptoms worsen. PATIENT REFERRED TO:  Follow-up Information    None           DISCHARGE MEDICATIONS:  Current Discharge Medication List        START taking these medications    Details   methocarbamoL (ROBAXIN) 500 mg tablet Take 1 Tablet by mouth two (2) times a day for 10 days.   Qty: 20 Tablet, Refills: 0  Start date: 2/26/2023, End date: 3/8/2023               DISCONTINUED MEDICATIONS:  Current Discharge Medication List          I am the Primary Clinician of Record: Nabil Oliveros MD (electronically signed)    (Please note that parts of this dictation were completed with voice recognition software. Quite often unanticipated grammatical, syntax, homophones, and other interpretive errors are inadvertently transcribed by the computer software. Please disregards these errors.  Please excuse any errors that have escaped final proofreading.)

## 2023-03-16 PROBLEM — E11.9 DIABETES MELLITUS (HCC): Status: ACTIVE | Noted: 2023-03-16

## 2023-03-16 PROBLEM — E03.9 ACQUIRED HYPOTHYROIDISM: Status: ACTIVE | Noted: 2023-03-16

## 2023-03-16 PROBLEM — F41.1 GAD (GENERALIZED ANXIETY DISORDER): Status: ACTIVE | Noted: 2023-03-16

## 2023-03-16 PROBLEM — I10 HYPERTENSION: Status: ACTIVE | Noted: 2020-09-01

## 2023-03-16 PROBLEM — I48.20 CHRONIC ATRIAL FIBRILLATION (HCC): Status: ACTIVE | Noted: 2023-03-16

## 2023-03-16 PROBLEM — A41.9 SEPSIS (HCC): Status: ACTIVE | Noted: 2023-03-16

## 2023-03-16 PROBLEM — N39.0 UTI (URINARY TRACT INFECTION): Status: ACTIVE | Noted: 2023-03-16

## 2023-03-16 PROBLEM — J45.909 ASTHMA: Status: ACTIVE | Noted: 2023-03-16

## 2023-03-16 PROBLEM — N17.9 AKI (ACUTE KIDNEY INJURY) (HCC): Status: ACTIVE | Noted: 2023-03-16

## 2023-03-21 PROBLEM — L03.115 CELLULITIS OF RIGHT LOWER EXTREMITY: Status: ACTIVE | Noted: 2023-03-21

## 2023-03-23 PROBLEM — L89.90 PRESSURE ULCER: Status: ACTIVE | Noted: 2023-03-16

## 2023-03-25 PROBLEM — M79.89 NECROTIZING SOFT TISSUE INFECTION: Status: ACTIVE | Noted: 2023-03-16

## 2023-03-27 PROBLEM — I87.2 VENOUS STASIS DERMATITIS OF BOTH LOWER EXTREMITIES: Status: ACTIVE | Noted: 2023-03-27

## 2023-04-15 PROBLEM — N39.0 UTI (URINARY TRACT INFECTION): Status: RESOLVED | Noted: 2023-03-16 | Resolved: 2023-04-15

## 2023-04-17 PROBLEM — Z94.5 S/P SPLIT THICKNESS SKIN GRAFT: Status: ACTIVE | Noted: 2023-04-17

## 2023-04-17 PROBLEM — S81.801D LEG WOUND, RIGHT, SUBSEQUENT ENCOUNTER: Status: ACTIVE | Noted: 2023-04-17

## 2023-06-14 PROBLEM — L24.A2: Status: ACTIVE | Noted: 2023-06-14

## 2023-06-20 PROBLEM — R31.0 GROSS HEMATURIA: Status: ACTIVE | Noted: 2023-06-20

## 2023-06-22 PROBLEM — Z94.5 H/O SKIN GRAFT: Status: ACTIVE | Noted: 2023-06-22

## 2023-06-26 PROBLEM — N30.01 ACUTE CYSTITIS WITH HEMATURIA: Status: ACTIVE | Noted: 2023-06-26

## 2023-06-26 PROBLEM — R79.1 SUPRATHERAPEUTIC INR: Status: ACTIVE | Noted: 2023-06-26

## 2023-06-26 PROBLEM — D62 ACUTE BLOOD LOSS ANEMIA: Status: ACTIVE | Noted: 2023-06-26

## 2025-03-11 NOTE — DISCHARGE INSTRUCTIONS
Discharge Instructions for  [facility]  [address] [place], [address] [facility]  Telephone: [phone] [address] [facility] Information: Should you experience any significant changes in your wound(s) or have questions about your wound care, please contact the [facility] at [address] 8:00 am - 4:30. If you need help with your wound outside these hours and cannot wait until we are again available, contact your PCP or go to the hospital emergency room. NAME:  Mary Kate Higginbotham  YOB: 1955  DATE:  9/15/2022    : [facility].     To Right dorsal foot - Triamcinolone ointment     Edema Control: Every morning immediately when getting up should be applied to affected leg(s) from mid foot to knee making sure to cover the heel. Remove every night before going to bed if desired. Apply: When You get home use [x] Compression Stocking      [x]Right Leg           [x]Left Leg      In clinic: [x] Tubigrip F [x] Right Leg Double Layer                 [x] Left Leg Double Layer       [x] Elevate leg(s) above the level of the heart when sitting. [x] Avoid prolonged standing in one place. [address]    Your treatment has been completed at [facility] outpatient wound clinic on 9/15/2022, per Dr. Lilian Rosenthal. We know patients have several options when choosing a health care provider. We would like to express our sincere appreciation for having had the chance to be yours. More importantly, we enjoy having you as part of our family. We also hope your experience with us has surpassed your expectations and that you have been pleased with our service. We appreciate the confidence you've shown in selecting us as your health care provider and we will continue or commitment to provide the highest quality of service. Again, thank you for choosing us for your health care needs.  If you have any further questions or concerns, please call [phone]. It has been our pleasure serving you and we hope to continue our relationship in the future, if the need occurs.      Sincerely,  [address] [facility]         Physician Signature:_______________________  Dr. Thad Ortiz adrenal insufficiency s/p immunotherapy from [facility]